# Patient Record
Sex: FEMALE | Race: BLACK OR AFRICAN AMERICAN | Employment: OTHER | URBAN - METROPOLITAN AREA
[De-identification: names, ages, dates, MRNs, and addresses within clinical notes are randomized per-mention and may not be internally consistent; named-entity substitution may affect disease eponyms.]

---

## 2023-04-13 DIAGNOSIS — M81.0 OSTEOPOROSIS: Primary | ICD-10-CM

## 2023-11-17 ENCOUNTER — HOSPITAL ENCOUNTER (EMERGENCY)
Facility: HOSPITAL | Age: 88
Discharge: HOME | End: 2023-11-17
Attending: EMERGENCY MEDICINE
Payer: MEDICARE

## 2023-11-17 VITALS
TEMPERATURE: 98 F | DIASTOLIC BLOOD PRESSURE: 83 MMHG | HEART RATE: 91 BPM | RESPIRATION RATE: 19 BRPM | SYSTOLIC BLOOD PRESSURE: 148 MMHG | OXYGEN SATURATION: 99 %

## 2023-11-17 DIAGNOSIS — R07.81 RIB PAIN: ICD-10-CM

## 2023-11-17 DIAGNOSIS — S29.8XXA BLUNT TRAUMA TO CHEST, INITIAL ENCOUNTER: Primary | ICD-10-CM

## 2023-11-17 DIAGNOSIS — D32.9 MENINGIOMA: ICD-10-CM

## 2023-11-17 DIAGNOSIS — S20.212A CONTUSION OF LEFT CHEST WALL, INITIAL ENCOUNTER: ICD-10-CM

## 2023-11-17 LAB
ALBUMIN SERPL-MCNC: 4.1 G/DL (ref 3.4–4.8)
ALBUMIN/GLOB SERPL: 0.9 RATIO (ref 1.1–2)
ALP SERPL-CCNC: 100 UNIT/L (ref 40–150)
ALT SERPL-CCNC: 10 UNIT/L (ref 0–55)
AST SERPL-CCNC: 15 UNIT/L (ref 5–34)
BASOPHILS # BLD AUTO: 0.03 X10(3)/MCL
BASOPHILS NFR BLD AUTO: 0.5 %
BILIRUB SERPL-MCNC: 0.5 MG/DL
BUN SERPL-MCNC: 13.7 MG/DL (ref 9.8–20.1)
CALCIUM SERPL-MCNC: 10.8 MG/DL (ref 8.4–10.2)
CHLORIDE SERPL-SCNC: 101 MMOL/L (ref 98–111)
CO2 SERPL-SCNC: 24 MMOL/L (ref 23–31)
CREAT SERPL-MCNC: 0.81 MG/DL (ref 0.55–1.02)
EOSINOPHIL # BLD AUTO: 0.08 X10(3)/MCL (ref 0–0.9)
EOSINOPHIL NFR BLD AUTO: 1.4 %
ERYTHROCYTE [DISTWIDTH] IN BLOOD BY AUTOMATED COUNT: 16.2 % (ref 11.5–17)
GFR SERPLBLD CREATININE-BSD FMLA CKD-EPI: >60 MLS/MIN/1.73/M2
GLOBULIN SER-MCNC: 4.6 GM/DL (ref 2.4–3.5)
GLUCOSE SERPL-MCNC: 127 MG/DL (ref 75–121)
HCT VFR BLD AUTO: 39.5 % (ref 37–47)
HGB BLD-MCNC: 12.7 G/DL (ref 12–16)
IMM GRANULOCYTES # BLD AUTO: 0.03 X10(3)/MCL (ref 0–0.04)
IMM GRANULOCYTES NFR BLD AUTO: 0.5 %
LYMPHOCYTES # BLD AUTO: 1 X10(3)/MCL (ref 0.6–4.6)
LYMPHOCYTES NFR BLD AUTO: 17 %
MCH RBC QN AUTO: 26.1 PG (ref 27–31)
MCHC RBC AUTO-ENTMCNC: 32.2 G/DL (ref 33–36)
MCV RBC AUTO: 81.1 FL (ref 80–94)
MONOCYTES # BLD AUTO: 0.44 X10(3)/MCL (ref 0.1–1.3)
MONOCYTES NFR BLD AUTO: 7.5 %
NEUTROPHILS # BLD AUTO: 4.3 X10(3)/MCL (ref 2.1–9.2)
NEUTROPHILS NFR BLD AUTO: 73.1 %
NRBC BLD AUTO-RTO: 0 %
PLATELET # BLD AUTO: 393 X10(3)/MCL (ref 130–400)
PMV BLD AUTO: 9.1 FL (ref 7.4–10.4)
POTASSIUM SERPL-SCNC: 3.7 MMOL/L (ref 3.5–5.1)
PROT SERPL-MCNC: 8.7 GM/DL (ref 5.8–7.6)
RBC # BLD AUTO: 4.87 X10(6)/MCL (ref 4.2–5.4)
SODIUM SERPL-SCNC: 140 MMOL/L (ref 132–146)
WBC # SPEC AUTO: 5.88 X10(3)/MCL (ref 4.5–11.5)

## 2023-11-17 PROCEDURE — 85025 COMPLETE CBC W/AUTO DIFF WBC: CPT | Performed by: STUDENT IN AN ORGANIZED HEALTH CARE EDUCATION/TRAINING PROGRAM

## 2023-11-17 PROCEDURE — 99285 EMERGENCY DEPT VISIT HI MDM: CPT | Mod: 25

## 2023-11-17 PROCEDURE — 25000003 PHARM REV CODE 250

## 2023-11-17 PROCEDURE — 25500020 PHARM REV CODE 255: Performed by: EMERGENCY MEDICINE

## 2023-11-17 PROCEDURE — 80053 COMPREHEN METABOLIC PANEL: CPT | Performed by: STUDENT IN AN ORGANIZED HEALTH CARE EDUCATION/TRAINING PROGRAM

## 2023-11-17 RX ORDER — DICLOFENAC SODIUM 10 MG/G
2 GEL TOPICAL DAILY
Qty: 100 G | Refills: 0 | Status: SHIPPED | OUTPATIENT
Start: 2023-11-17 | End: 2023-12-17

## 2023-11-17 RX ORDER — ACETAMINOPHEN 325 MG/1
650 TABLET ORAL
Status: COMPLETED | OUTPATIENT
Start: 2023-11-17 | End: 2023-11-17

## 2023-11-17 RX ADMIN — IOPAMIDOL 100 ML: 755 INJECTION, SOLUTION INTRAVENOUS at 03:11

## 2023-11-17 RX ADMIN — ACETAMINOPHEN 650 MG: 325 TABLET, FILM COATED ORAL at 01:11

## 2023-11-17 NOTE — ED PROVIDER NOTES
Encounter Date: 11/17/2023       History     Chief Complaint   Patient presents with    Fall     AASi post fall from step ladder on first step, hit chest. Denies blood thinners or hitting head.     89-year-old female presents to the emergency department complaining of chest pain after fall from a step ladder.  Striking her head or LOC. reports mild dizziness    The history is provided by the patient.     Review of patient's allergies indicates:  Not on File  No past medical history on file.  No past surgical history on file.  No family history on file.     Review of Systems   Constitutional:  Negative for fever.   Respiratory:  Negative for shortness of breath.    Cardiovascular:  Positive for chest pain.   Gastrointestinal:  Negative for abdominal pain, nausea and vomiting.   Neurological:  Positive for dizziness. Negative for syncope and headaches.       Physical Exam     Initial Vitals [11/17/23 1214]   BP Pulse Resp Temp SpO2   (!) 149/95 94 18 97.5 °F (36.4 °C) 99 %      MAP       --         Physical Exam    Nursing note and vitals reviewed.  Constitutional: She appears well-developed and well-nourished. No distress.   HENT:   Head: Normocephalic and atraumatic.   Mouth/Throat: Oropharynx is clear and moist.   Eyes: Conjunctivae are normal. Pupils are equal, round, and reactive to light.   Neck: Neck supple.   Normal range of motion.  Cardiovascular:  Normal rate, regular rhythm and normal heart sounds.           No murmur heard.  Pulmonary/Chest: Breath sounds normal. No respiratory distress. She exhibits tenderness (Chest wall tenderness, no gross deformity).   Abdominal: Abdomen is soft. She exhibits no distension. There is no abdominal tenderness.   Musculoskeletal:         General: Normal range of motion.      Cervical back: Normal range of motion and neck supple.     Neurological: She is alert and oriented to person, place, and time. GCS score is 15. GCS eye subscore is 4. GCS verbal subscore is 5. GCS  motor subscore is 6.   Skin: Skin is warm and dry. No rash noted.   Psychiatric: She has a normal mood and affect.         ED Course   Procedures  Labs Reviewed   COMPREHENSIVE METABOLIC PANEL - Abnormal; Notable for the following components:       Result Value    Glucose Level 127 (*)     Calcium Level Total 10.8 (*)     Protein Total 8.7 (*)     Globulin 4.6 (*)     Albumin/Globulin Ratio 0.9 (*)     All other components within normal limits   CBC WITH DIFFERENTIAL - Abnormal; Notable for the following components:    MCH 26.1 (*)     MCHC 32.2 (*)     All other components within normal limits   CBC W/ AUTO DIFFERENTIAL    Narrative:     The following orders were created for panel order CBC auto differential.  Procedure                               Abnormality         Status                     ---------                               -----------         ------                     CBC with Differential[[id number]]        Abnormal            Final result                 Please view results for these tests on the individual orders.          Imaging Results              CT Head Without Contrast (Final result)  Result time 11/17/23 15:56:12      Final result by Ria Hinojosa MD (11/17/23 15:56:12)                   Impression:      Chronic age-related changes.    Calcified mass in the temporal fossa on the right side likely representing a calcified meningioma.  Contrast enhanced CT scan or MRI can be performed for correlation.      Electronically signed by: Ria Hinojosa  Date:    11/17/2023  Time:    15:56               Narrative:    EXAMINATION:  CT HEAD WITHOUT CONTRAST    CLINICAL HISTORY:  Head trauma, minor (Age >= 65y);    TECHNIQUE:  Multiple axial images were obtained from the base of the brain to the vertex without contrast administration.  Sagittal and coronal reconstructions were performed..Automatic exposure control is utilized to reduce patient radiation  exposure.    COMPARISON:  None    FINDINGS:  3.8 x 1.8 cm mass in the temporal fossa on the right side.  It is densely calcified.  Findings most likely represent a calcified meningioma..  No hemorrhage is seen.  No acute infarct is seen.  There is some cerebral atrophy seen.  There is some compensatory ventricular dilatation and periventricular white matter changes consistent with the patient's age.  Calvarium is intact.  The posterior fossa is unremarkable..  The visualized portions of the paranasal sinuses show no acute abnormality.                                       CT Cervical Spine Without Contrast (Final result)  Result time 11/17/23 15:50:35      Final result by Ria Hinojosa MD (11/17/23 15:50:35)                   Impression:      Degenerative changes in the cervical spine    Large calcified lesion in the temporal fossa on the right side likely representing a calcified meningioma      Electronically signed by: Ria Hinojosa  Date:    11/17/2023  Time:    15:50               Narrative:    EXAMINATION:  CT CERVICAL SPINE WITHOUT CONTRAST    CLINICAL HISTORY:  Neck trauma (Age >= 65y);    TECHNIQUE:  Low dose axial images, sagittal and coronal reformations were performed though the cervical spine.  Contrast was not administered. Automatic exposure control is utilized to reduce patient radiation exposure.    COMPARISON:  None    FINDINGS:  The vertebral body heights are well maintained.  The alignment is well maintained.  The bones are  osteoporotic.  There are degenerative changes seen throughout the cervical spine.  No evidence of acute fracture is seen.  No dislocation is seen.  Odontoid lateral masses appear grossly unremarkable.  No soft tissue abnormality is seen.  No retropharyngeal abnormality is seen.  Visualized portions of the airway appear grossly unremarkable.  Visualized portion of the thoracic inlet appears unremarkable    There is a large calcified mass in the temporal region  on the right side likely representing a large calcified meningioma                                       CT Chest Abdomen Pelvis With IV Contrast (XPD) (Final result)  Result time 11/17/23 15:48:06      Final result by Ria Hinojosa MD (11/17/23 15:48:06)                   Impression:      No evidence of acute trauma    Cholelithiasis      Electronically signed by: Ria Hinojosa  Date:    11/17/2023  Time:    15:48               Narrative:    EXAMINATION:  CT CHEST ABDOMEN PELVIS WITH IV CONTRAST (XPD)    CLINICAL HISTORY:  Polytrauma, blunt;    TECHNIQUE:  Low dose axial images, sagittal and coronal reformations were obtained from the thoracic inlet to the pubic symphysis following the IV contrast administration. Automatic exposure control is utilized to reduce patient radiation exposure.    COMPARISON:  None    FINDINGS:  There are changes seen consistent with COPD and emphysema in the lungs bilaterally.  There is mild bibasilar atelectasis.  No pneumothorax is seen.  No pulmonary contusion is seen.  No pleural effusion is seen.  No infiltrate is seen.    The thoracic aorta is normal in caliber.  No dissection or aneurysm is seen.  No retrosternal hematoma is seen.    The abdominal aorta appears grossly unremarkable.  No dissection or posttraumatic changes are seen.    The heart appears normal.    The liver appears normal.  No liver mass or lesion is seen.  No evidence of liver laceration is seen.    There is a gallstone in the gallbladder...    The spleen appears normal.  No splenic laceration is seen.  The pancreas appears grossly unremarkable.  No pancreatic mass or lesion is seen.  No inflammation is seen.    No adrenal abnormality is seen.  No adrenal nodule is seen.    The kidneys are well perfused.  No hydronephrosis is seen.  No hydroureter is seen.  No retroperitoneal hematoma is seen.    Visualized portions of the bowel shows no acute abnormality.  No colitis is seen.  No diverticulitis is  seen.  No colonic mass is seen.    No free air is seen.  No free fluid is seen.    Urinary bladder appears unremarkable.    No sternal fracture is seen.  No thoracic spine fracture is seen.  No lumbar spine fracture is seen.  No pelvic fracture is seen.  There is an old fracture of the ischium on the right side.  No rib fractures are seen.                                       X-Ray Ribs 2 View Right (Final result)  Result time 11/17/23 13:16:45      Final result by Claudio Myers MD (11/17/23 13:16:45)                   Narrative:    EXAMINATION  *XR RIBS 2 VIEW RIGHT  *XR RIBS 2 VIEW LEFT    CLINICAL HISTORY  rib pain; Pleurodynia    TECHNIQUE  Total of 4 images of the right and left ribs.    COMPARISON  None available at the time of initial interpretation.    FINDINGS  There are questionable nondisplaced, subtle fractures involving the posterior right 6th and 7th ribs, with no other convincing acute osseous disruption identified.  Regional degenerative changes are present.    No acute cardiopulmonary process is appreciated.  There is diffuse calcification of the ectatic aorta.    IMPRESSION  1. Possible nondisplaced posterior right 6th and 7th rib fractures.  Correlation with evidence of point tenderness at these locations recommended.  2. No other convincing acute abnormality.  Chronic secondary findings discussed above.      Electronically signed by: Claudio Myers  Date:    11/17/2023  Time:    13:16                                     X-Ray Ribs 2 View Left (Final result)  Result time 11/17/23 13:16:45   Procedure changed from X-Ray Ribs 3 Views Bilateral     Final result by Claudio Myers MD (11/17/23 13:16:45)                   Narrative:    EXAMINATION  *XR RIBS 2 VIEW RIGHT  *XR RIBS 2 VIEW LEFT    CLINICAL HISTORY  rib pain; Pleurodynia    TECHNIQUE  Total of 4 images of the right and left ribs.    COMPARISON  None available at the time of initial interpretation.    FINDINGS  There are questionable  nondisplaced, subtle fractures involving the posterior right 6th and 7th ribs, with no other convincing acute osseous disruption identified.  Regional degenerative changes are present.    No acute cardiopulmonary process is appreciated.  There is diffuse calcification of the ectatic aorta.    IMPRESSION  1. Possible nondisplaced posterior right 6th and 7th rib fractures.  Correlation with evidence of point tenderness at these locations recommended.  2. No other convincing acute abnormality.  Chronic secondary findings discussed above.      Electronically signed by: Claudio Myers  Date:    11/17/2023  Time:    13:16                                     Medications   acetaminophen tablet 650 mg (650 mg Oral Given 11/17/23 1323)   iopamidoL (ISOVUE-370) injection 100 mL (100 mLs Intravenous Given 11/17/23 1534)     Medical Decision Making  Problems Addressed:  Blunt trauma to chest, initial encounter: acute illness or injury  Contusion of left chest wall, initial encounter: acute illness or injury  Meningioma: undiagnosed new problem with uncertain prognosis  Rib pain: acute illness or injury    Amount and/or Complexity of Data Reviewed  Radiology: ordered.    Risk  Prescription drug management.      ED assessment:    Ms. Easley presented for evaluation following fall from a ladder, complained of chest wall pain, dizziness.  Denies striking her head or LOC. No anticoagulant use.    Differential diagnosis (including but not limited to):   Minor head injury, intracranial hemorrhage, concussion, whiplash injury, spinal strain, rib fracture, pulmonary contusion, pneumothorax    ED management:   Rib x-ray describes possible sequential rib fractures on the right.  Given her advanced age and comorbid risk multiple rib fractures, additional imaging obtained to evaluate for extent of injury.  Additional imaging demonstrated no clinically significant intracranial spinal, thoracic or abdominal injury.  Additionally, laboratory  studies were unremarkable.  Noted posterior fossa calcified mass on the CT of the head.  Unlikely related to acute presentation; however, patient and family at bedside informed of this finding, advised to follow up with primary care to further investigate. ED return precautions reviewed at the bedside and provided in the written discharge instructions. All questions answered to the best of my ability.       My independent radiology interpretation:   Chest x-ray:  No displaced rib fracture, no pneumothorax.        Amount and/or Complexity of Data Reviewed  Independent historian: none   Summary of history:   External data reviewed: no prior records available for review   Summary of data reviewed:  No recent pertinent records available for review  Risk and benefits of testing: discussed   Labs: ordered and reviewed  Radiology: ordered and independent interpretation performed (see above or ED course)      Risk  Prescription drug management   Shared decision making     Critical Care  none    Frida BARAJAS MD personally performed the history, PE, MDM, and procedures as documented above and agree with the scribe's documentation.              ED Course as of 12/11/23 1602   Fri Nov 17, 2023   1552 No rib fracture seen on CT imaging.  No additional injuries identified.  Patient resting comfortably.  Will discharge home. [KS]      ED Course User Index  [KS] Frida Hamilton MD           Clinical Impression:  Final diagnoses:  [R07.81] Rib pain  [S29.8XXA] Blunt trauma to chest, initial encounter (Primary)  [D32.9] Meningioma  [S20.212A] Contusion of left chest wall, initial encounter          ED Disposition Condition    Discharge Stable          ED Prescriptions       Medication Sig Dispense Start Date End Date Auth. Provider    diclofenac sodium (VOLTAREN) 1 % Gel Apply 2 g topically once daily. 100 g 11/17/2023 12/17/2023 Frida Hamilton MD          Follow-up Information       Follow up With Specialties Details Why  Contact Info    Your primary care physician  Schedule an appointment as soon as possible for a visit   Call your primary care physician or you can call [phone] to schedule with a primary care physician    [facility] - Emergency Dept Emergency Medicine   [address] [place] [id number] [phone]             Frida Hamilton MD  12/11/23 [id number]

## 2023-11-17 NOTE — FIRST PROVIDER EVALUATION
Medical screening examination initiated.  I have conducted a focused provider triage encounter, findings are as follows:    Brief history of present illness:  arrived to ED due to fall from step stool. Reports she fell on landed on step stool. C/o rib pain. Denies LOC, head injury, or neck pain. Denies BT use.     Vitals:    11/17/23 1214   BP: (!) 149/95   Pulse: 94   Resp: 18   Temp: 97.5 °F (36.4 °C)   TempSrc: Temporal   SpO2: 99%       Pertinent physical exam:  awake, alert, has non-labored breathing.     Brief workup plan:  imaging and medication     Preliminary workup initiated; this workup will be continued and followed by the physician or advanced practice provider that is assigned to the patient when roomed.

## 2023-11-17 NOTE — DISCHARGE INSTRUCTIONS
Your CT scan shows a growth on the left temporal area of your brain consistent with a calcified meningioma.  This can be further investigated in the outpatient setting with additional CT or MRI imaging.  Please inform your primary care physician of these findings.

## 2024-04-01 ENCOUNTER — HOSPITAL ENCOUNTER (EMERGENCY)
Facility: HOSPITAL | Age: 89
Discharge: HOME | End: 2024-04-01
Attending: STUDENT IN AN ORGANIZED HEALTH CARE EDUCATION/TRAINING PROGRAM
Payer: MEDICARE

## 2024-04-01 VITALS
HEIGHT: 64 IN | SYSTOLIC BLOOD PRESSURE: 120 MMHG | OXYGEN SATURATION: 94 % | HEART RATE: 66 BPM | WEIGHT: 115 LBS | RESPIRATION RATE: 20 BRPM | BODY MASS INDEX: 19.63 KG/M2 | DIASTOLIC BLOOD PRESSURE: 54 MMHG | TEMPERATURE: 98 F

## 2024-04-01 DIAGNOSIS — M10.9 GOUT OF LEFT ANKLE, UNSPECIFIED CAUSE, UNSPECIFIED CHRONICITY: Primary | ICD-10-CM

## 2024-04-01 DIAGNOSIS — R60.9 SWELLING: ICD-10-CM

## 2024-04-01 DIAGNOSIS — E87.6 HYPOKALEMIA: ICD-10-CM

## 2024-04-01 DIAGNOSIS — M25.473 ANKLE SWELLING: ICD-10-CM

## 2024-04-01 LAB
ANION GAP SERPL CALC-SCNC: 11 MEQ/L
ANION GAP SERPL CALC-SCNC: 14 MEQ/L
BASOPHILS # BLD AUTO: 0.01 X10(3)/MCL
BASOPHILS NFR BLD AUTO: 0.2 %
BNP BLD-MCNC: 63.8 PG/ML
BUN SERPL-MCNC: 12.1 MG/DL (ref 9.8–20.1)
BUN SERPL-MCNC: 14.1 MG/DL (ref 9.8–20.1)
CALCIUM SERPL-MCNC: 9.7 MG/DL (ref 8.4–10.2)
CALCIUM SERPL-MCNC: 9.8 MG/DL (ref 8.4–10.2)
CHLORIDE SERPL-SCNC: 102 MMOL/L (ref 98–111)
CHLORIDE SERPL-SCNC: 106 MMOL/L (ref 98–111)
CO2 SERPL-SCNC: 26 MMOL/L (ref 23–31)
CO2 SERPL-SCNC: 27 MMOL/L (ref 23–31)
CREAT SERPL-MCNC: 0.72 MG/DL (ref 0.55–1.02)
CREAT SERPL-MCNC: 0.78 MG/DL (ref 0.55–1.02)
CREAT/UREA NIT SERPL: 16
CREAT/UREA NIT SERPL: 20
EOSINOPHIL # BLD AUTO: 0.15 X10(3)/MCL (ref 0–0.9)
EOSINOPHIL NFR BLD AUTO: 2.6 %
ERYTHROCYTE [DISTWIDTH] IN BLOOD BY AUTOMATED COUNT: 16 % (ref 11.5–17)
ERYTHROCYTE [SEDIMENTATION RATE] IN BLOOD: 64 MM/HR (ref 0–20)
GFR SERPLBLD CREATININE-BSD FMLA CKD-EPI: >60 MLS/MIN/1.73/M2
GFR SERPLBLD CREATININE-BSD FMLA CKD-EPI: >60 MLS/MIN/1.73/M2
GLUCOSE SERPL-MCNC: 141 MG/DL (ref 75–121)
GLUCOSE SERPL-MCNC: 95 MG/DL (ref 75–121)
HCT VFR BLD AUTO: 32.5 % (ref 37–47)
HGB BLD-MCNC: 10.5 G/DL (ref 12–16)
IMM GRANULOCYTES # BLD AUTO: 0.01 X10(3)/MCL (ref 0–0.04)
IMM GRANULOCYTES NFR BLD AUTO: 0.2 %
LYMPHOCYTES # BLD AUTO: 1.04 X10(3)/MCL (ref 0.6–4.6)
LYMPHOCYTES NFR BLD AUTO: 18 %
MCH RBC QN AUTO: 26.3 PG (ref 27–31)
MCHC RBC AUTO-ENTMCNC: 32.3 G/DL (ref 33–36)
MCV RBC AUTO: 81.5 FL (ref 80–94)
MONOCYTES # BLD AUTO: 0.65 X10(3)/MCL (ref 0.1–1.3)
MONOCYTES NFR BLD AUTO: 11.3 %
NEUTROPHILS # BLD AUTO: 3.91 X10(3)/MCL (ref 2.1–9.2)
NEUTROPHILS NFR BLD AUTO: 67.7 %
PLATELET # BLD AUTO: 307 X10(3)/MCL (ref 130–400)
PMV BLD AUTO: 9.2 FL (ref 7.4–10.4)
POC CARDIAC TROPONIN I: 0.02 NG/ML (ref 0–0.08)
POTASSIUM SERPL-SCNC: 2.7 MMOL/L (ref 3.5–5.1)
POTASSIUM SERPL-SCNC: 3.1 MMOL/L (ref 3.5–5.1)
RBC # BLD AUTO: 3.99 X10(6)/MCL (ref 4.2–5.4)
SAMPLE: NORMAL
SODIUM SERPL-SCNC: 143 MMOL/L (ref 132–146)
SODIUM SERPL-SCNC: 143 MMOL/L (ref 132–146)
URATE SERPL-MCNC: 6.4 MG/DL (ref 2.6–6)
WBC # SPEC AUTO: 5.77 X10(3)/MCL (ref 4.5–11.5)

## 2024-04-01 PROCEDURE — 84550 ASSAY OF BLOOD/URIC ACID: CPT | Performed by: STUDENT IN AN ORGANIZED HEALTH CARE EDUCATION/TRAINING PROGRAM

## 2024-04-01 PROCEDURE — 25000003 PHARM REV CODE 250: Performed by: STUDENT IN AN ORGANIZED HEALTH CARE EDUCATION/TRAINING PROGRAM

## 2024-04-01 PROCEDURE — 96375 TX/PRO/DX INJ NEW DRUG ADDON: CPT

## 2024-04-01 PROCEDURE — 84484 ASSAY OF TROPONIN QUANT: CPT

## 2024-04-01 PROCEDURE — 80048 BASIC METABOLIC PNL TOTAL CA: CPT | Mod: 91 | Performed by: STUDENT IN AN ORGANIZED HEALTH CARE EDUCATION/TRAINING PROGRAM

## 2024-04-01 PROCEDURE — 85652 RBC SED RATE AUTOMATED: CPT | Performed by: STUDENT IN AN ORGANIZED HEALTH CARE EDUCATION/TRAINING PROGRAM

## 2024-04-01 PROCEDURE — 83880 ASSAY OF NATRIURETIC PEPTIDE: CPT | Performed by: STUDENT IN AN ORGANIZED HEALTH CARE EDUCATION/TRAINING PROGRAM

## 2024-04-01 PROCEDURE — 85025 COMPLETE CBC W/AUTO DIFF WBC: CPT | Performed by: STUDENT IN AN ORGANIZED HEALTH CARE EDUCATION/TRAINING PROGRAM

## 2024-04-01 PROCEDURE — 93010 ELECTROCARDIOGRAM REPORT: CPT | Mod: ,,, | Performed by: INTERNAL MEDICINE

## 2024-04-01 PROCEDURE — 63600175 PHARM REV CODE 636 W HCPCS: Performed by: STUDENT IN AN ORGANIZED HEALTH CARE EDUCATION/TRAINING PROGRAM

## 2024-04-01 PROCEDURE — 99285 EMERGENCY DEPT VISIT HI MDM: CPT | Mod: 25

## 2024-04-01 PROCEDURE — 93005 ELECTROCARDIOGRAM TRACING: CPT

## 2024-04-01 PROCEDURE — 96365 THER/PROPH/DIAG IV INF INIT: CPT

## 2024-04-01 RX ORDER — POTASSIUM CHLORIDE 7.45 MG/ML
10 INJECTION INTRAVENOUS
Status: DISPENSED | OUTPATIENT
Start: 2024-04-01 | End: 2024-04-01

## 2024-04-01 RX ORDER — KETOROLAC TROMETHAMINE 30 MG/ML
15 INJECTION, SOLUTION INTRAMUSCULAR; INTRAVENOUS
Status: COMPLETED | OUTPATIENT
Start: 2024-04-01 | End: 2024-04-01

## 2024-04-01 RX ORDER — POTASSIUM CHLORIDE 20 MEQ/1
20 TABLET, EXTENDED RELEASE ORAL DAILY
Qty: 4 TABLET | Refills: 0 | Status: SHIPPED | OUTPATIENT
Start: 2024-04-01 | End: 2024-04-05

## 2024-04-01 RX ORDER — IBUPROFEN 600 MG/1
600 TABLET ORAL 3 TIMES DAILY
Qty: 15 TABLET | Refills: 0 | Status: SHIPPED | OUTPATIENT
Start: 2024-04-01 | End: 2024-04-06

## 2024-04-01 RX ORDER — POTASSIUM CHLORIDE 20 MEQ/1
40 TABLET, EXTENDED RELEASE ORAL
Status: COMPLETED | OUTPATIENT
Start: 2024-04-01 | End: 2024-04-01

## 2024-04-01 RX ADMIN — KETOROLAC TROMETHAMINE 15 MG: 30 INJECTION, SOLUTION INTRAMUSCULAR at 04:04

## 2024-04-01 RX ADMIN — POTASSIUM CHLORIDE 40 MEQ: 1500 TABLET, EXTENDED RELEASE ORAL at 04:04

## 2024-04-01 RX ADMIN — POTASSIUM CHLORIDE 10 MEQ: 7.46 INJECTION, SOLUTION INTRAVENOUS at 04:04

## 2024-04-01 NOTE — ED PROVIDER NOTES
Encounter Date: 4/1/2024       History     Chief Complaint   Patient presents with    Foot Pain     Pt reports of bilateral foot pain and swelling also complains of chest discomfort this morning aroound 8 am- but denies CP/SOB at this time.      Patient is a 89-year-old  female with a history of diabetes, hypertension hyperlipidemia presented to the ER today with a several week history of bilateral ankle swelling and pain.  She states the left is slightly worse than the right.  She denies any associated calf pain, erythema.  She denies any chest pain, shortness of breath.  She states she is tried nothing for relief.  She states she has no orthopnea or heart failure that she knows of.  She denies any fevers, chills, cough, congestion, abdominal pain, dysuria, hematuria.      Review of patient's allergies indicates:  No Known Allergies  No past medical history on file.  No past surgical history on file.  No family history on file.     Review of Systems   Constitutional:  Negative for chills, fatigue and fever.   HENT:  Negative for congestion, sore throat and trouble swallowing.    Eyes:  Negative for pain and visual disturbance.   Respiratory:  Negative for cough, shortness of breath and wheezing.    Cardiovascular:  Negative for chest pain and palpitations.   Gastrointestinal:  Negative for abdominal pain, blood in stool, constipation, diarrhea, nausea and vomiting.   Genitourinary:  Negative for dysuria and hematuria.   Musculoskeletal:  Positive for arthralgias. Negative for back pain and myalgias.   Skin:  Negative for rash and wound.   Neurological:  Negative for seizures, syncope and headaches.   Psychiatric/Behavioral:  Negative for confusion. The patient is not nervous/anxious.        Physical Exam     Initial Vitals [04/01/24 1358]   BP Pulse Resp Temp SpO2   114/66 86 20 98.3 °F (36.8 °C) 97 %      MAP       --         Physical Exam    Nursing note and vitals reviewed.  Constitutional: She  appears well-developed and well-nourished. She is not diaphoretic. No distress.   HENT:   Head: Normocephalic.   Right Ear: External ear normal.   Left Ear: External ear normal.   Nose: Nose normal.   Eyes: Conjunctivae and EOM are normal. Right eye exhibits no discharge. Left eye exhibits no discharge. No scleral icterus.   Neck:   Normal range of motion.  Cardiovascular:  Normal rate, regular rhythm and normal heart sounds.     Exam reveals no gallop and no friction rub.       No murmur heard.  Pulmonary/Chest: Breath sounds normal. No stridor. No respiratory distress. She has no wheezes. She has no rhonchi. She has no rales.   Abdominal: Abdomen is soft. She exhibits no distension. There is no abdominal tenderness. There is no rebound and no guarding.   Musculoskeletal:         General: Normal range of motion.      Cervical back: Normal range of motion.      Comments: This slight tenderness to palpation over the medial malleolus bilaterally.  No obvious edema or calf tenderness on exam.  DP pulses equal bilaterally.  No signs of trauma on exam.  No erythema overlying the joints.     Neurological: She is alert.   Skin: Skin is warm. No rash noted. No erythema.   Psychiatric: She has a normal mood and affect. Her behavior is normal.         ED Course   Procedures  Labs Reviewed   BASIC METABOLIC PANEL - Abnormal; Notable for the following components:       Result Value    Potassium Level 2.7 (*)     Glucose Level 141 (*)     All other components within normal limits   SEDIMENTATION RATE, AUTOMATED - Abnormal; Notable for the following components:    Sed Rate 64 (*)     All other components within normal limits   URIC ACID - Abnormal; Notable for the following components:    Uric Acid 6.4 (*)     All other components within normal limits   CBC WITH DIFFERENTIAL - Abnormal; Notable for the following components:    RBC 3.99 (*)     Hgb 10.5 (*)     Hct 32.5 (*)     MCH 26.3 (*)     MCHC 32.3 (*)     All other  components within normal limits   BASIC METABOLIC PANEL - Abnormal; Notable for the following components:    Potassium Level 3.1 (*)     All other components within normal limits   B-TYPE NATRIURETIC PEPTIDE - Normal   CBC W/ AUTO DIFFERENTIAL    Narrative:     The following orders were created for panel order CBC Auto Differential.  Procedure                               Abnormality         Status                     ---------                               -----------         ------                     CBC with Differential[[id number]]       Abnormal            Final result                 Please view results for these tests on the individual orders.   TROPONIN ISTAT   POCT TROPONIN     EKG Readings: (Independently Interpreted)   Initial Reading: No STEMI. Rhythm: Normal Sinus Rhythm. Heart Rate: 87. Ectopy: No Ectopy. Conduction: Normal. ST Segments: Normal ST Segments. T Waves: Normal. Axis: Left Axis Deviation.       Imaging Results              X-Ray Chest 1 View (Final result)  Result time 04/01/24 14:50:21      Final result by Karan Metzger MD (04/01/24 14:50:21)                   Impression:      Cardiomegaly      Electronically signed by: Karan Metzger  Date:    04/01/2024  Time:    14:50               Narrative:    EXAMINATION:  XR CHEST 1 VIEW    CPT 44229    CLINICAL HISTORY:  cp;    FINDINGS:  Examination reveals mediastinal silhouette to be within normal limits cardiac silhouette is enlarged lung fields are clear and free of gross infiltrates atelectasis or effusions                                       X-Ray Ankle Complete Left (Final result)  Result time 04/01/24 14:48:46      Final result by Damien West MD (04/01/24 14:48:46)                   Impression:      No acute fractures are seen      Electronically signed by: Damien West MD  Date:    04/01/2024  Time:    14:48               Narrative:    EXAMINATION:  XR ANKLE COMPLETE 3 VIEW LEFT    CLINICAL HISTORY:  Edema,  unspecified    TECHNIQUE:  AP, lateral and oblique views of the left ankle were performed.    COMPARISON:  None    FINDINGS:  There are no fractures seen.  There is no dislocation.  The bone is osteopenic.                                       X-Ray Ankle Complete Right (Final result)  Result time 04/01/24 14:49:12      Final result by Damien West MD (04/01/24 14:49:12)                   Impression:      No acute abnormalities are demonstrated.      Electronically signed by: Damien West MD  Date:    04/01/2024  Time:    14:49               Narrative:    EXAMINATION:  XR ANKLE COMPLETE 3 VIEW RIGHT    CLINICAL HISTORY:  Edema, unspecified    TECHNIQUE:  AP, lateral, and oblique images of the right ankle were performed.    COMPARISON:  None    FINDINGS:  There are no fractures seen.  There is no dislocation.  The bone is osteopenic.                                       Medications   potassium chloride 10 mEq in 100 mL IVPB (0 mEq Intravenous Stopped 4/1/24 1722)   potassium chloride SA CR tablet 40 mEq (40 mEq Oral Given 4/1/24 1613)   ketorolac injection 15 mg (15 mg Intravenous Given 4/1/24 1612)     Medical Decision Making  Differentials: Gout, septic arthritis, ankle sprain, CHF exacerbation  History in his the patient and son   89-year-old well-appearing female with stable vital signs presents with chronic ankle pain.  It was slightly warm to palpation and has concerns for septic arthritis versus gouty arthritis.  X-ray confirmed no acute osseous abnormalities.  Basic labs were reassuring other than hypokalemia 2.7 which was replaced IV and p.o..  Repeat potassium of 3.1 and thus we can continue this potassium daily for the next 5 days.  This was sent to the pharmacy.  Uric acid was slightly elevated raising my suspicion for gouty arthritis as a was no leukocytosis.  Sed rate only mildly elevated.  Toradol given here and sent to the pharmacy.  Gout diet also discussed.  All questions answered in layman's  terms and return precautions were discussed.    Amount and/or Complexity of Data Reviewed  Labs: ordered. Decision-making details documented in ED Course.  Radiology: ordered. Decision-making details documented in ED Course.    Risk  Prescription drug management.                                      Clinical Impression:  Final diagnoses:  [M25.473] Ankle swelling  [R60.9] Swelling  [M10.9] Gout of left ankle, unspecified cause, unspecified chronicity (Primary)  [E87.6] Hypokalemia          ED Disposition Condition    Discharge Stable          ED Prescriptions       Medication Sig Dispense Start Date End Date Auth. Provider    potassium chloride SA (K-DUR,KLOR-CON) 20 MEQ tablet Take 1 tablet (20 mEq total) by mouth once daily. for 4 days 4 tablet 4/1/2024 4/5/2024 Yoni Pyle MD    ibuprofen (ADVIL,MOTRIN) 600 MG tablet Take 1 tablet (600 mg total) by mouth 3 (three) times daily. for 5 days 15 tablet 4/1/2024 4/6/2024 Yoni Pyle MD          Follow-up Information       Follow up With Specialties Details Why Contact Info    [facility] - Emergency Dept Emergency Medicine  If symptoms worsen [address] [id number] [phone]             Yoni Pyle MD  04/01/24 9277

## 2024-04-01 NOTE — ED NOTES
Pt assisted to bedside commode, tolerated well, denies any other concerns and verbalizes plan of care; no distress noted at this time

## 2024-04-02 LAB
OHS QRS DURATION: 88 MS
OHS QTC CALCULATION: 445 MS

## 2024-04-10 ENCOUNTER — HOSPITAL ENCOUNTER (EMERGENCY)
Facility: HOSPITAL | Age: 89
Discharge: HOME | End: 2024-04-10
Attending: EMERGENCY MEDICINE
Payer: MEDICARE

## 2024-04-10 VITALS
TEMPERATURE: 98 F | BODY MASS INDEX: 19.63 KG/M2 | RESPIRATION RATE: 18 BRPM | WEIGHT: 115 LBS | HEIGHT: 64 IN | SYSTOLIC BLOOD PRESSURE: 107 MMHG | HEART RATE: 77 BPM | DIASTOLIC BLOOD PRESSURE: 60 MMHG | OXYGEN SATURATION: 97 %
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
## 2024-04-10 DIAGNOSIS — S30.0XXA CONTUSION OF COCCYX, INITIAL ENCOUNTER: Primary | ICD-10-CM

## 2024-04-10 PROCEDURE — 99283 EMERGENCY DEPT VISIT LOW MDM: CPT | Mod: 25

## 2024-04-10 NOTE — ED PROVIDER NOTES
Encounter Date: 4/10/2024       History     Chief Complaint   Patient presents with    Tailbone Pain     Pt states on 4/3 she fell and landed on her tailbone, pt rates the pain as a 10/10, per caregiver states she might of gotten dizzy before she fell. Denies any LOC at that time.     This 89-year-old female fell on April 3rd and landed.  On her tailbone she presents with pain in her sacrum and tailbone rated 10/10.  She had no head injury no loss of consciousness.  She would refused to come in until today.       Review of patient's allergies indicates:  No Known Allergies  Past Medical History:   Diagnosis Date    Diabetes mellitus     GERD (gastroesophageal reflux disease)     Hypertension     Pancreatic insufficiency      History reviewed. No pertinent surgical history.  History reviewed. No pertinent family history.  Social History     Tobacco Use    Smoking status: Never    Smokeless tobacco: Never   Substance Use Topics    Alcohol use: Never    Drug use: Never     Review of Systems   Constitutional:  Negative for fever.   HENT:  Negative for sore throat.    Respiratory:  Negative for shortness of breath.    Cardiovascular:  Negative for chest pain.   Gastrointestinal:  Negative for nausea.   Genitourinary:  Negative for dysuria.   Musculoskeletal:  Negative for back pain.   Skin:  Negative for rash.   Neurological:  Negative for weakness.   Hematological:  Does not bruise/bleed easily.       Physical Exam     Initial Vitals [04/10/24 1404]   BP Pulse Resp Temp SpO2   107/60 77 18 98.1 °F (36.7 °C) 97 %      MAP       --         Physical Exam    Nursing note and vitals reviewed.  Constitutional: She appears well-developed and well-nourished.   HENT:   Head: Normocephalic and atraumatic.   Eyes: Conjunctivae and EOM are normal. Pupils are equal, round, and reactive to light.   Neck: Neck supple.   Normal range of motion.  Cardiovascular:  Normal rate, regular rhythm, normal heart sounds and intact distal pulses.            Pulmonary/Chest: Breath sounds normal.   Abdominal: Abdomen is soft. Bowel sounds are normal.   Musculoskeletal:         General: Tenderness (Over the sacrum and coccyx) present. Normal range of motion.      Cervical back: Normal range of motion and neck supple.     Neurological: She is alert and oriented to person, place, and time. She has normal strength.   Skin: Skin is warm and dry. Capillary refill takes less than 2 seconds.   Psychiatric: She has a normal mood and affect. Her behavior is normal. Judgment and thought content normal.         ED Course   Procedures  Labs Reviewed - No data to display       Imaging Results              X-Ray Pelvis Routine AP (Preliminary result)  Result time 04/10/24 14:36:19      Wet Read by Cuong Peacock MD (04/10/24 14:36:19, [facility] Emergency [facility], Emergency Medicine)    No fracture seen                                     X-Ray Sacrum And Coccyx (Preliminary result)  Result time 04/10/24 14:35:35      Wet Read by Cuong Peacock MD (04/10/24 14:35:35, [facility] Emergency Dept, Emergency Medicine)    No fracture seen                                     Medications - No data to display  Medical Decision Making  Amount and/or Complexity of Data Reviewed  Radiology: ordered and independent interpretation performed.                                      Clinical Impression:  Final diagnoses:  [W19.XXXA, Y92.009] Fall at home  [S30.0XXA] Contusion of coccyx, initial encounter (Primary)          ED Disposition Condition    Discharge Stable          ED Prescriptions    None       Follow-up Information    None          Cuong Peacock MD  04/10/24 0671

## 2025-03-30 ENCOUNTER — HOSPITAL ENCOUNTER (INPATIENT)
Facility: HOSPITAL | Age: OVER 89
LOS: 2 days | Discharge: HOME HEALTH | DRG: 872 | End: 2025-04-02
Attending: FAMILY MEDICINE | Admitting: FAMILY MEDICINE
Payer: MEDICARE

## 2025-03-30 DIAGNOSIS — Z91.89 AT RISK FOR LONG QT SYNDROME: ICD-10-CM

## 2025-03-30 DIAGNOSIS — N39.0 ACUTE UTI: Primary | ICD-10-CM

## 2025-03-30 DIAGNOSIS — R07.9 CHEST PAIN: ICD-10-CM

## 2025-03-30 DIAGNOSIS — R53.1 WEAKNESS: ICD-10-CM

## 2025-03-30 DIAGNOSIS — E86.0 DEHYDRATION: ICD-10-CM

## 2025-03-30 DIAGNOSIS — Z72.0 TOBACCO USE: ICD-10-CM

## 2025-03-30 LAB
ALBUMIN SERPL-MCNC: 3.3 G/DL (ref 3.4–4.8)
ALBUMIN/GLOB SERPL: 0.7 RATIO (ref 1.1–2)
ALP SERPL-CCNC: 88 UNIT/L (ref 40–150)
ALT SERPL-CCNC: 16 UNIT/L (ref 0–55)
ANION GAP SERPL CALC-SCNC: 13 MEQ/L
AST SERPL-CCNC: 28 UNIT/L (ref 11–45)
BACTERIA #/AREA URNS AUTO: ABNORMAL /HPF
BASOPHILS # BLD AUTO: 0.01 X10(3)/MCL
BASOPHILS NFR BLD AUTO: 0.1 %
BILIRUB SERPL-MCNC: 0.5 MG/DL
BILIRUB UR QL STRIP.AUTO: NEGATIVE
BUN SERPL-MCNC: 46.4 MG/DL (ref 9.8–20.1)
CALCIUM SERPL-MCNC: 9.3 MG/DL (ref 8.4–10.2)
CHLORIDE SERPL-SCNC: 101 MMOL/L (ref 98–111)
CLARITY UR: CLEAR
CO2 SERPL-SCNC: 25 MMOL/L (ref 23–31)
COLOR UR AUTO: ABNORMAL
CREAT SERPL-MCNC: 1.88 MG/DL (ref 0.55–1.02)
CREAT/UREA NIT SERPL: 25
EOSINOPHIL # BLD AUTO: 0.01 X10(3)/MCL (ref 0–0.9)
EOSINOPHIL NFR BLD AUTO: 0.1 %
ERYTHROCYTE [DISTWIDTH] IN BLOOD BY AUTOMATED COUNT: 17 % (ref 11.5–17)
FLUAV AG UPPER RESP QL IA.RAPID: NOT DETECTED
FLUBV AG UPPER RESP QL IA.RAPID: NOT DETECTED
GFR SERPLBLD CREATININE-BSD FMLA CKD-EPI: 25 ML/MIN/1.73/M2
GLOBULIN SER-MCNC: 4.6 GM/DL (ref 2.4–3.5)
GLUCOSE SERPL-MCNC: 190 MG/DL (ref 75–121)
GLUCOSE UR QL STRIP: NEGATIVE
HCT VFR BLD AUTO: 32.5 % (ref 37–47)
HEMOCCULT SP1 STL QL: NEGATIVE
HGB BLD-MCNC: 10.4 G/DL (ref 12–16)
HGB UR QL STRIP: ABNORMAL
IMM GRANULOCYTES # BLD AUTO: 0.25 X10(3)/MCL (ref 0–0.04)
IMM GRANULOCYTES NFR BLD AUTO: 1.4 %
KETONES UR QL STRIP: NEGATIVE
LACTATE SERPL-SCNC: 2.9 MMOL/L (ref 0.5–2.2)
LACTATE SERPL-SCNC: 3.1 MMOL/L (ref 0.5–2.2)
LEUKOCYTE ESTERASE UR QL STRIP: ABNORMAL
LIPASE SERPL-CCNC: 7 U/L
LYMPHOCYTES # BLD AUTO: 0.63 X10(3)/MCL (ref 0.6–4.6)
LYMPHOCYTES NFR BLD AUTO: 3.6 %
MAGNESIUM SERPL-MCNC: 1.3 MG/DL (ref 1.6–2.6)
MCH RBC QN AUTO: 25.2 PG (ref 27–31)
MCHC RBC AUTO-ENTMCNC: 32 G/DL (ref 33–36)
MCV RBC AUTO: 78.9 FL (ref 80–94)
MONOCYTES # BLD AUTO: 1.3 X10(3)/MCL (ref 0.1–1.3)
MONOCYTES NFR BLD AUTO: 7.5 %
NEUTROPHILS # BLD AUTO: 15.16 X10(3)/MCL (ref 2.1–9.2)
NEUTROPHILS NFR BLD AUTO: 87.3 %
NITRITE UR QL STRIP: NEGATIVE
PH UR STRIP: 6 [PH]
PLATELET # BLD AUTO: 318 X10(3)/MCL (ref 130–400)
PMV BLD AUTO: 9.8 FL (ref 7.4–10.4)
POTASSIUM SERPL-SCNC: 3.3 MMOL/L (ref 3.5–5.1)
PROT SERPL-MCNC: 7.9 GM/DL (ref 5.8–7.6)
PROT UR QL STRIP: 100
RBC # BLD AUTO: 4.12 X10(6)/MCL (ref 4.2–5.4)
RBC #/AREA URNS AUTO: ABNORMAL /HPF
SARS-COV-2 RNA RESP QL NAA+PROBE: NOT DETECTED
SODIUM SERPL-SCNC: 139 MMOL/L (ref 136–145)
SP GR UR STRIP.AUTO: 1.01 (ref 1–1.03)
SQUAMOUS #/AREA URNS AUTO: ABNORMAL /HPF
UROBILINOGEN UR STRIP-ACNC: 0.2
WBC # BLD AUTO: 17.36 X10(3)/MCL (ref 4.5–11.5)
WBC #/AREA URNS AUTO: ABNORMAL /HPF

## 2025-03-30 PROCEDURE — 83735 ASSAY OF MAGNESIUM: CPT | Performed by: FAMILY MEDICINE

## 2025-03-30 PROCEDURE — 83605 ASSAY OF LACTIC ACID: CPT | Performed by: FAMILY MEDICINE

## 2025-03-30 PROCEDURE — 80053 COMPREHEN METABOLIC PANEL: CPT | Performed by: FAMILY MEDICINE

## 2025-03-30 PROCEDURE — 85025 COMPLETE CBC W/AUTO DIFF WBC: CPT | Performed by: FAMILY MEDICINE

## 2025-03-30 PROCEDURE — 99285 EMERGENCY DEPT VISIT HI MDM: CPT | Mod: 25

## 2025-03-30 PROCEDURE — 83690 ASSAY OF LIPASE: CPT | Performed by: FAMILY MEDICINE

## 2025-03-30 PROCEDURE — 25000003 PHARM REV CODE 250: Performed by: FAMILY MEDICINE

## 2025-03-30 PROCEDURE — G0378 HOSPITAL OBSERVATION PER HR: HCPCS

## 2025-03-30 PROCEDURE — 25000003 PHARM REV CODE 250: Performed by: SPECIALIST

## 2025-03-30 PROCEDURE — 96375 TX/PRO/DX INJ NEW DRUG ADDON: CPT

## 2025-03-30 PROCEDURE — 96361 HYDRATE IV INFUSION ADD-ON: CPT

## 2025-03-30 PROCEDURE — 87086 URINE CULTURE/COLONY COUNT: CPT | Performed by: FAMILY MEDICINE

## 2025-03-30 PROCEDURE — P9612 CATHETERIZE FOR URINE SPEC: HCPCS

## 2025-03-30 PROCEDURE — 63600175 PHARM REV CODE 636 W HCPCS: Performed by: SPECIALIST

## 2025-03-30 PROCEDURE — 82272 OCCULT BLD FECES 1-3 TESTS: CPT | Performed by: FAMILY MEDICINE

## 2025-03-30 PROCEDURE — 96365 THER/PROPH/DIAG IV INF INIT: CPT

## 2025-03-30 PROCEDURE — 0240U COVID/FLU A&B PCR: CPT | Performed by: SPECIALIST

## 2025-03-30 PROCEDURE — 81001 URINALYSIS AUTO W/SCOPE: CPT | Performed by: FAMILY MEDICINE

## 2025-03-30 RX ORDER — CEFTRIAXONE 1 G/1
1 INJECTION, POWDER, FOR SOLUTION INTRAMUSCULAR; INTRAVENOUS
Status: DISCONTINUED | OUTPATIENT
Start: 2025-03-31 | End: 2025-04-02 | Stop reason: HOSPADM

## 2025-03-30 RX ORDER — ONDANSETRON HYDROCHLORIDE 2 MG/ML
4 INJECTION, SOLUTION INTRAVENOUS EVERY 8 HOURS PRN
Status: DISCONTINUED | OUTPATIENT
Start: 2025-03-31 | End: 2025-03-31

## 2025-03-30 RX ORDER — POLYETHYLENE GLYCOL 3350 17 G/17G
17 POWDER, FOR SOLUTION ORAL DAILY
Status: DISCONTINUED | OUTPATIENT
Start: 2025-03-31 | End: 2025-04-02 | Stop reason: HOSPADM

## 2025-03-30 RX ORDER — SODIUM CHLORIDE 9 MG/ML
1000 INJECTION, SOLUTION INTRAVENOUS CONTINUOUS
Status: ACTIVE | OUTPATIENT
Start: 2025-03-30 | End: 2025-03-31

## 2025-03-30 RX ORDER — TALC
6 POWDER (GRAM) TOPICAL NIGHTLY PRN
Status: DISCONTINUED | OUTPATIENT
Start: 2025-03-31 | End: 2025-03-31

## 2025-03-30 RX ORDER — SODIUM CHLORIDE 0.9 % (FLUSH) 0.9 %
2 SYRINGE (ML) INJECTION EVERY 6 HOURS PRN
Status: DISCONTINUED | OUTPATIENT
Start: 2025-03-31 | End: 2025-04-02 | Stop reason: HOSPADM

## 2025-03-30 RX ORDER — LOPERAMIDE HYDROCHLORIDE 2 MG/1
2 CAPSULE ORAL
Status: DISCONTINUED | OUTPATIENT
Start: 2025-03-31 | End: 2025-04-02 | Stop reason: HOSPADM

## 2025-03-30 RX ORDER — ONDANSETRON HYDROCHLORIDE 2 MG/ML
4 INJECTION, SOLUTION INTRAVENOUS EVERY 8 HOURS PRN
Status: DISCONTINUED | OUTPATIENT
Start: 2025-03-31 | End: 2025-04-02 | Stop reason: HOSPADM

## 2025-03-30 RX ORDER — CEFTRIAXONE 1 G/1
1 INJECTION, POWDER, FOR SOLUTION INTRAMUSCULAR; INTRAVENOUS
Status: COMPLETED | OUTPATIENT
Start: 2025-03-30 | End: 2025-03-30

## 2025-03-30 RX ORDER — ACETAMINOPHEN 325 MG/1
650 TABLET ORAL EVERY 8 HOURS PRN
Status: DISCONTINUED | OUTPATIENT
Start: 2025-03-31 | End: 2025-03-31

## 2025-03-30 RX ORDER — POTASSIUM CHLORIDE 20 MEQ/1
20 TABLET, EXTENDED RELEASE ORAL
Status: COMPLETED | OUTPATIENT
Start: 2025-03-30 | End: 2025-03-30

## 2025-03-30 RX ORDER — FAMOTIDINE 20 MG/1
20 TABLET, FILM COATED ORAL 2 TIMES DAILY
Status: DISCONTINUED | OUTPATIENT
Start: 2025-03-31 | End: 2025-04-02 | Stop reason: HOSPADM

## 2025-03-30 RX ORDER — ACETAMINOPHEN 325 MG/1
650 TABLET ORAL
Status: COMPLETED | OUTPATIENT
Start: 2025-03-30 | End: 2025-03-30

## 2025-03-30 RX ORDER — MAGNESIUM SULFATE 1 G/100ML
1 INJECTION INTRAVENOUS ONCE
Status: COMPLETED | OUTPATIENT
Start: 2025-03-30 | End: 2025-03-30

## 2025-03-30 RX ADMIN — SODIUM CHLORIDE 500 ML: 0.9 INJECTION, SOLUTION INTRAVENOUS at 06:03

## 2025-03-30 RX ADMIN — SODIUM CHLORIDE 500 ML: 9 INJECTION, SOLUTION INTRAVENOUS at 10:03

## 2025-03-30 RX ADMIN — SODIUM CHLORIDE 500 ML: 9 INJECTION, SOLUTION INTRAVENOUS at 08:03

## 2025-03-30 RX ADMIN — POTASSIUM CHLORIDE 20 MEQ: 1500 TABLET, EXTENDED RELEASE ORAL at 06:03

## 2025-03-30 RX ADMIN — ACETAMINOPHEN 650 MG: 325 TABLET ORAL at 07:03

## 2025-03-30 RX ADMIN — CEFTRIAXONE SODIUM 1 G: 1 INJECTION, POWDER, FOR SOLUTION INTRAMUSCULAR; INTRAVENOUS at 09:03

## 2025-03-30 RX ADMIN — MAGNESIUM SULFATE IN DEXTROSE 1 G: 10 INJECTION, SOLUTION INTRAVENOUS at 07:03

## 2025-03-30 NOTE — ED PROVIDER NOTES
Encounter Date: 3/30/2025       History     Chief Complaint   Patient presents with    Abdominal Pain     Pt reports of severe abd pain with bloody stools and weakness that has been ongoing for a few days.      Pt is a 89-year-old female presenting with blood in her urine and generalized weakness. She has experienced weakness for about a month. Pt also endorsing dysuria. Pt denies f/c/n/v/d/c, melena, hematochezia, CP, SOB, dizziness.        Review of patient's allergies indicates:  No Known Allergies  Past Medical History:   Diagnosis Date    Diabetes mellitus     GERD (gastroesophageal reflux disease)     Hypertension     Pancreatic insufficiency      No past surgical history on file.  No family history on file.  Social History[1]  Review of Systems   All other systems reviewed and are negative.      Physical Exam     Initial Vitals [03/30/25 1654]   BP Pulse Resp Temp SpO2   (!) 80/43 86 20 98.2 °F (36.8 °C) 95 %      MAP       --         Physical Exam    Nursing note and vitals reviewed.  Constitutional: She appears well-developed and well-nourished.   HENT:   Head: Normocephalic and atraumatic.   Eyes: EOM are normal.   Neck:   Normal range of motion.  Cardiovascular:  Normal rate, regular rhythm and normal heart sounds.           Pulmonary/Chest: Breath sounds normal.   Abdominal: Abdomen is soft. Bowel sounds are normal. She exhibits no distension. There is abdominal tenderness (epigastric TTP). There is no rebound and no guarding.   Musculoskeletal:         General: Normal range of motion.      Cervical back: Normal range of motion.     Neurological: She is alert.   Skin: Skin is warm and dry.   Psychiatric: She has a normal mood and affect. Thought content normal.         ED Course   Procedures  Labs Reviewed   COMPREHENSIVE METABOLIC PANEL - Abnormal       Result Value    Sodium 139      Potassium 3.3 (*)     Chloride 101      CO2 25      Glucose 190 (*)     Blood Urea Nitrogen 46.4 (*)     Creatinine 1.88  (*)     Calcium 9.3      Protein Total 7.9 (*)     Albumin 3.3 (*)     Globulin 4.6 (*)     Albumin/Globulin Ratio 0.7 (*)     Bilirubin Total 0.5      ALP 88      ALT 16      AST 28      eGFR 25      Anion Gap 13.0      BUN/Creatinine Ratio 25     CBC WITH DIFFERENTIAL - Abnormal    WBC 17.36 (*)     RBC 4.12 (*)     Hgb 10.4 (*)     Hct 32.5 (*)     MCV 78.9 (*)     MCH 25.2 (*)     MCHC 32.0 (*)     RDW 17.0      Platelet 318      MPV 9.8      Neut % 87.3      Lymph % 3.6      Mono % 7.5      Eos % 0.1      Basophil % 0.1      Imm Grans % 1.4      Neut # 15.16 (*)     Lymph # 0.63      Mono # 1.30      Eos # 0.01      Baso # 0.01      Imm Gran # 0.25 (*)    URINALYSIS, REFLEX TO URINE CULTURE - Abnormal    Color, UA Straw      Appearance, UA Clear      Specific Gravity, UA 1.015      pH, UA 6.0      Protein,  (*)     Glucose, UA Negative      Ketones, UA Negative      Blood, UA Moderate (*)     Bilirubin, UA Negative      Urobilinogen, UA 0.2      Nitrites, UA Negative      Leukocyte Esterase, UA Trace (*)    LACTIC ACID, PLASMA - Abnormal    Lactic Acid Level 2.9 (*)    MAGNESIUM - Abnormal    Magnesium Level 1.30 (*)    LACTIC ACID, PLASMA - Abnormal    Lactic Acid Level 3.1 (*)    URINALYSIS, MICROSCOPIC - Abnormal    Bacteria, UA Many (*)     RBC, UA 0-2      WBC, UA 11-20 (*)     Squamous Epithelial Cells, UA Few (*)    LIPASE - Normal    Lipase Level 7     OCCULT BLOOD, STOOL 1ST SPECIMEN - Normal    Occult Blood Stool 1 Negative     COVID/FLU A&B PCR - Normal    Influenza A PCR Not Detected      Influenza B PCR Not Detected      SARS-CoV-2 PCR Not Detected      Narrative:     The Xpert Xpress SARS-CoV-2/FLU/RSV plus is a rapid, multiplexed real-time PCR test intended for the simultaneous qualitative detection and differentiation of SARS-CoV-2, Influenza A, Influenza B, and respiratory syncytial virus (RSV) viral RNA in either nasopharyngeal swab or nasal swab specimens.         CULTURE, URINE   CBC  W/ AUTO DIFFERENTIAL    Narrative:     The following orders were created for panel order CBC auto differential.  Procedure                               Abnormality         Status                     ---------                               -----------         ------                     CBC with Differential[[id number]]       Abnormal            Final result                 Please view results for these tests on the individual orders.          Imaging Results              CT Abdomen Pelvis  Without Contrast (Final result)  Result time 03/30/25 18:59:56      Final result by Damien West MD (03/30/25 18:59:56)                   Impression:      Significantly limited study due the lack of contrast and motion.    Cholelithiasis without evidence of acute cholecystitis      Electronically signed by: Damien West MD  Date:    03/30/2025  Time:    18:59               Narrative:    EXAMINATION:  CT ABDOMEN PELVIS WITHOUT CONTRAST    CLINICAL HISTORY:  Abdominal pain, acute, nonlocalized;  bloody stools    TECHNIQUE:  Low dose axial images, sagittal and coronal reformations were obtained from the lung bases to the pubic symphysis.  No oral contrast is given.    Automatic exposure control (AEC) was utilized for dose reduction.    Dose: 186.73 mGycm    COMPARISON:  11/17/2023    FINDINGS:  There are very small bilateral pleural effusions liver appears normal.  There is cholelithiasis without definite evidence of acute cholecystitis.  Spleen appears normal.  Pancreas is not well visualized.  The adrenals are not well seen.  Kidneys appear normal.  Aorta shows calcification without an aneurysm present.                                       X-Ray Chest AP Portable (Final result)  Result time 03/30/25 18:09:43      Final result by Damien West MD (03/30/25 18:09:43)                   Impression:      No acute abnormalities are seen      Electronically signed by: Damien West MD  Date:    03/30/2025  Time:    18:09                Narrative:    EXAMINATION:  XR CHEST AP PORTABLE    CLINICAL HISTORY:  Weakness    TECHNIQUE:  Single frontal view of the chest was performed.    COMPARISON:  04/01/2024    FINDINGS:  No infiltrates are seen.  Heart is enlarged.  There is tortuosity and calcification of the aorta.                                       Medications   sodium chloride 0.9% flush 2 mL (has no administration in time range)   melatonin tablet 6 mg (has no administration in time range)   cefTRIAXone injection 1 g (has no administration in time range)   acetaminophen tablet 650 mg (has no administration in time range)   polyethylene glycol packet 17 g (has no administration in time range)   loperamide capsule 2 mg (has no administration in time range)   famotidine tablet 20 mg (has no administration in time range)   ondansetron injection 4 mg (has no administration in time range)   ondansetron injection 4 mg (has no administration in time range)   acetaminophen tablet 650 mg (has no administration in time range)   0.9% NaCl infusion (has no administration in time range)   potassium chloride SA CR tablet 20 mEq (20 mEq Oral Given 3/30/25 1811)   sodium chloride 0.9% bolus 500 mL 500 mL (0 mLs Intravenous Stopped 3/30/25 2021)   acetaminophen tablet 650 mg (650 mg Oral Given 3/30/25 1915)   magnesium sulfate in dextrose IVPB (premix) 1 g (0 g Intravenous Stopped 3/30/25 2021)   sodium chloride 0.9% bolus 500 mL 500 mL (0 mLs Intravenous Stopped 3/30/25 2146)   cefTRIAXone injection 1 g (1 g Intravenous Given 3/30/25 2126)   sodium chloride 0.9% bolus 500 mL 500 mL (0 mLs Intravenous Stopped 3/30/25 2328)     Medical Decision Making  Overall 89 yo F with hematuria, dysuria, weakness. On PE, epigastric TTP, normoactive bowel sounds, lungs CTA BL, heart RRR.  Differential Diagnosis:   GI bleed, electrolyte abnormality, IVVD, UTI  ED Management:  /62 on my exam, which is pt's baseline BP. Other VSSAF  On PE, epigastric TTP, normoactive  bowel sounds, lungs CTA BL, heart RRR.   Pt appears to be in no distress   CBC, CMP, UA, lipase, FOBT, Mg, CXR ordered. FOBT neg. Lipase 7. CBC with WBC 17. K 3.3. 20mEQ Kcl given.      Care of this pt turned over to night attending at 1800. Sign out given and all questions answered.      Amount and/or Complexity of Data Reviewed  Labs: ordered. Decision-making details documented in ED Course.  Radiology: ordered. Decision-making details documented in ED Course.    Risk  OTC drugs.  Prescription drug management.  Decision regarding hospitalization.  Risk Details:   I, DR. FUNES, ASSUMED CARE OF THIS PATIENT AT 6:00 P.M.; patient was normotensive with normal pulse at this time; her earlier temperature nancy to 101.4; she also had an elevation in her lactic acid at 2.9 but she also had evidence of dehydration; she received Rocephin for UTI in her blood pressure dipped into systolic of the 80s; she was given IV fluids and has been given a total of 1500 mL, cautiously given because of her age; patient is awake and alert and answering questions although she does have dementia; she does not appear to be septic although she does have fever and her blood pressure decreased; will admit after discussing with Dr. Arreguin and continue Rocephin 1 g Q 24 hours and will give normal saline at 75 mL/hour    Patient Vitals for the past 24 hrs:   BP Temp Temp src Pulse Resp SpO2   03/31/25 0027 (!) 123/56 (!) 100.9 °F (38.3 °C) Oral 87 20 --   03/30/25 2329 (!) 104/44 -- -- 76 18 97 %   03/30/25 2245 (!) 95/45 -- -- 79 (!) 26 97 %   03/30/25 2230 (!) 93/42 -- -- 78 (!) 21 97 %   03/30/25 2215 -- 100.1 °F (37.8 °C) Oral -- -- --   03/30/25 2210 (!) 94/43 -- -- 78 20 97 %   03/30/25 2150 (!) 102/43 -- -- 76 14 97 %   03/30/25 2130 (!) 117/54 -- -- 79 (!) 28 97 %   03/30/25 2120 (!) 101/58 -- -- 82 (!) 25 95 %   03/30/25 2100 (!) 84/45 -- -- 80 (!) 22 97 %   03/30/25 2050 (!) 89/44 -- -- 89 (!) 26 100 %   03/30/25 2030 (!) 90/44 -- -- 81  (!) 27 97 %   03/30/25 2020 (!) 85/39 (!) 100.9 °F (38.3 °C) Oral 89 20 96 %   03/30/25 1945 -- (!) 101.4 °F (38.6 °C) Oral -- -- --   03/30/25 1913 (!) 112/54 -- -- 84 19 100 %   03/30/25 1815 (!) 118/59 -- -- 86 (!) 22 95 %   03/30/25 1745 -- -- -- 85 (!) 23 98 %   03/30/25 1730 (!) 117/52 -- -- 91 (!) 22 --   03/30/25 1715 118/62 -- -- 85 17 --   03/30/25 1654 (!) 80/43 98.2 °F (36.8 °C) -- 86 20 95 %                ED Course as of 03/31/25 0030   Sun Mar 30, 2025   2108 BUN(!): 46.4 [DD]   2108 Creatinine(!): 1.88 [DD]   2108 Potassium(!): 3.3 [DD]   2108 Magnesium (!): 1.30 [DD]   2221 Bacteria, UA(!): Many [DD]   2221 WBC, UA(!): 11-20 [DD]      ED Course User Index  [DD] Seth Moyer MD                           Clinical Impression:  Final diagnoses:  [R53.1] Weakness  [N39.0] Acute UTI (Primary)  [E86.0] Dehydration          ED Disposition Condition    Observation Stable                    [1]   Social History  Tobacco Use    Smoking status: Never    Smokeless tobacco: Never   Substance Use Topics    Alcohol use: Never    Drug use: Never        Seth Moyer MD  03/31/25 0031

## 2025-03-30 NOTE — Clinical Note
Diagnosis: Acute UTI [088704]   Future Attending Provider: NICKIE DOHERTY [[id number]]   Special Needs:: No Special Needs [1]

## 2025-03-30 NOTE — ED NOTES
Patient reports had a bloody bowel movement  times one been having weakness for one month . Decrease appetitie

## 2025-03-31 PROBLEM — N39.0 ACUTE UTI: Status: ACTIVE | Noted: 2025-03-31

## 2025-03-31 LAB
ALBUMIN SERPL-MCNC: 2.4 G/DL (ref 3.4–4.8)
ALBUMIN/GLOB SERPL: 0.7 RATIO (ref 1.1–2)
ALP SERPL-CCNC: 81 UNIT/L (ref 40–150)
ALT SERPL-CCNC: 14 UNIT/L (ref 0–55)
ANION GAP SERPL CALC-SCNC: 9 MEQ/L
AST SERPL-CCNC: 22 UNIT/L (ref 11–45)
BASOPHILS # BLD AUTO: 0.01 X10(3)/MCL
BASOPHILS NFR BLD AUTO: 0.1 %
BILIRUB SERPL-MCNC: 0.5 MG/DL
BUN SERPL-MCNC: 30.8 MG/DL (ref 9.8–20.1)
CALCIUM SERPL-MCNC: 7.8 MG/DL (ref 8.4–10.2)
CHLORIDE SERPL-SCNC: 109 MMOL/L (ref 98–111)
CO2 SERPL-SCNC: 24 MMOL/L (ref 23–31)
CREAT SERPL-MCNC: 0.87 MG/DL (ref 0.55–1.02)
CREAT/UREA NIT SERPL: 35
EOSINOPHIL # BLD AUTO: 0.01 X10(3)/MCL (ref 0–0.9)
EOSINOPHIL NFR BLD AUTO: 0.1 %
ERYTHROCYTE [DISTWIDTH] IN BLOOD BY AUTOMATED COUNT: 16.8 % (ref 11.5–17)
GFR SERPLBLD CREATININE-BSD FMLA CKD-EPI: >60 ML/MIN/1.73/M2
GLOBULIN SER-MCNC: 3.5 GM/DL (ref 2.4–3.5)
GLUCOSE SERPL-MCNC: 118 MG/DL (ref 75–121)
GLUCOSE SERPL-MCNC: 136 MG/DL (ref 70–110)
HCT VFR BLD AUTO: 29.7 % (ref 37–47)
HGB BLD-MCNC: 9.4 G/DL (ref 12–16)
IMM GRANULOCYTES # BLD AUTO: 0.31 X10(3)/MCL (ref 0–0.04)
IMM GRANULOCYTES NFR BLD AUTO: 2.2 %
LACTATE SERPL-SCNC: 1 MMOL/L (ref 0.5–2.2)
LYMPHOCYTES # BLD AUTO: 0.51 X10(3)/MCL (ref 0.6–4.6)
LYMPHOCYTES NFR BLD AUTO: 3.7 %
MAGNESIUM SERPL-MCNC: 1.6 MG/DL (ref 1.6–2.6)
MCH RBC QN AUTO: 25 PG (ref 27–31)
MCHC RBC AUTO-ENTMCNC: 31.6 G/DL (ref 33–36)
MCV RBC AUTO: 79 FL (ref 80–94)
MONOCYTES # BLD AUTO: 1.15 X10(3)/MCL (ref 0.1–1.3)
MONOCYTES NFR BLD AUTO: 8.3 %
NEUTROPHILS # BLD AUTO: 11.9 X10(3)/MCL (ref 2.1–9.2)
NEUTROPHILS NFR BLD AUTO: 85.6 %
PHOSPHATE SERPL-MCNC: 2.3 MG/DL (ref 2.3–4.7)
PLATELET # BLD AUTO: 287 X10(3)/MCL (ref 130–400)
PMV BLD AUTO: 9.8 FL (ref 7.4–10.4)
POCT GLUCOSE: 136 MG/DL (ref 70–110)
POCT GLUCOSE: 142 MG/DL (ref 70–110)
POTASSIUM SERPL-SCNC: 3.3 MMOL/L (ref 3.5–5.1)
PROT SERPL-MCNC: 5.9 GM/DL (ref 5.8–7.6)
RBC # BLD AUTO: 3.76 X10(6)/MCL (ref 4.2–5.4)
SODIUM SERPL-SCNC: 142 MMOL/L (ref 136–145)
WBC # BLD AUTO: 13.89 X10(3)/MCL (ref 4.5–11.5)

## 2025-03-31 PROCEDURE — 83735 ASSAY OF MAGNESIUM: CPT | Performed by: INTERNAL MEDICINE

## 2025-03-31 PROCEDURE — 97162 PT EVAL MOD COMPLEX 30 MIN: CPT

## 2025-03-31 PROCEDURE — 11000001 HC ACUTE MED/SURG PRIVATE ROOM

## 2025-03-31 PROCEDURE — 25000003 PHARM REV CODE 250: Performed by: SPECIALIST

## 2025-03-31 PROCEDURE — 99900035 HC TECH TIME PER 15 MIN (STAT)

## 2025-03-31 PROCEDURE — 63600175 PHARM REV CODE 636 W HCPCS: Performed by: PHYSICIAN ASSISTANT

## 2025-03-31 PROCEDURE — 97165 OT EVAL LOW COMPLEX 30 MIN: CPT

## 2025-03-31 PROCEDURE — 94799 UNLISTED PULMONARY SVC/PX: CPT

## 2025-03-31 PROCEDURE — 36415 COLL VENOUS BLD VENIPUNCTURE: CPT | Performed by: INTERNAL MEDICINE

## 2025-03-31 PROCEDURE — 96366 THER/PROPH/DIAG IV INF ADDON: CPT

## 2025-03-31 PROCEDURE — 63600175 PHARM REV CODE 636 W HCPCS: Performed by: SPECIALIST

## 2025-03-31 PROCEDURE — 80053 COMPREHEN METABOLIC PANEL: CPT | Performed by: INTERNAL MEDICINE

## 2025-03-31 PROCEDURE — 94760 N-INVAS EAR/PLS OXIMETRY 1: CPT

## 2025-03-31 PROCEDURE — 25000003 PHARM REV CODE 250: Performed by: FAMILY MEDICINE

## 2025-03-31 PROCEDURE — 96361 HYDRATE IV INFUSION ADD-ON: CPT

## 2025-03-31 PROCEDURE — 85025 COMPLETE CBC W/AUTO DIFF WBC: CPT | Performed by: INTERNAL MEDICINE

## 2025-03-31 PROCEDURE — 87040 BLOOD CULTURE FOR BACTERIA: CPT | Mod: 91 | Performed by: INTERNAL MEDICINE

## 2025-03-31 PROCEDURE — 25000003 PHARM REV CODE 250: Performed by: PHYSICIAN ASSISTANT

## 2025-03-31 PROCEDURE — 27000221 HC OXYGEN, UP TO 24 HOURS

## 2025-03-31 PROCEDURE — S4991 NICOTINE PATCH NONLEGEND: HCPCS | Performed by: PHYSICIAN ASSISTANT

## 2025-03-31 PROCEDURE — 83605 ASSAY OF LACTIC ACID: CPT | Performed by: INTERNAL MEDICINE

## 2025-03-31 PROCEDURE — 84100 ASSAY OF PHOSPHORUS: CPT | Performed by: INTERNAL MEDICINE

## 2025-03-31 RX ORDER — HYDROCODONE BITARTRATE AND ACETAMINOPHEN 5; 325 MG/1; MG/1
1 TABLET ORAL EVERY 6 HOURS PRN
Refills: 0 | Status: DISCONTINUED | OUTPATIENT
Start: 2025-03-31 | End: 2025-04-02 | Stop reason: HOSPADM

## 2025-03-31 RX ORDER — ALENDRONATE SODIUM 70 MG/1
70 TABLET ORAL
COMMUNITY

## 2025-03-31 RX ORDER — NALOXONE HCL 0.4 MG/ML
0.02 VIAL (ML) INJECTION
Status: DISCONTINUED | OUTPATIENT
Start: 2025-03-31 | End: 2025-04-02 | Stop reason: HOSPADM

## 2025-03-31 RX ORDER — POTASSIUM CHLORIDE 20 MEQ/1
20 TABLET, EXTENDED RELEASE ORAL 2 TIMES DAILY
Status: COMPLETED | OUTPATIENT
Start: 2025-03-31 | End: 2025-03-31

## 2025-03-31 RX ORDER — DICLOFENAC SODIUM 10 MG/G
2 GEL TOPICAL 2 TIMES DAILY
Status: DISCONTINUED | OUTPATIENT
Start: 2025-03-31 | End: 2025-04-02 | Stop reason: HOSPADM

## 2025-03-31 RX ORDER — ONDANSETRON 4 MG/1
8 TABLET, ORALLY DISINTEGRATING ORAL EVERY 8 HOURS PRN
Status: DISCONTINUED | OUTPATIENT
Start: 2025-03-31 | End: 2025-04-02 | Stop reason: HOSPADM

## 2025-03-31 RX ORDER — MAGNESIUM SULFATE HEPTAHYDRATE 40 MG/ML
2 INJECTION, SOLUTION INTRAVENOUS ONCE
Status: COMPLETED | OUTPATIENT
Start: 2025-03-31 | End: 2025-03-31

## 2025-03-31 RX ORDER — LOSARTAN POTASSIUM 100 MG/1
100 TABLET ORAL DAILY
COMMUNITY

## 2025-03-31 RX ORDER — HYDROCHLOROTHIAZIDE 25 MG/1
25 TABLET ORAL DAILY
COMMUNITY

## 2025-03-31 RX ORDER — LOSARTAN POTASSIUM 50 MG/1
100 TABLET ORAL DAILY
Status: DISCONTINUED | OUTPATIENT
Start: 2025-03-31 | End: 2025-04-02 | Stop reason: HOSPADM

## 2025-03-31 RX ORDER — INSULIN ASPART 100 [IU]/ML
0-5 INJECTION, SOLUTION INTRAVENOUS; SUBCUTANEOUS
Status: DISCONTINUED | OUTPATIENT
Start: 2025-03-31 | End: 2025-04-02 | Stop reason: HOSPADM

## 2025-03-31 RX ORDER — IPRATROPIUM BROMIDE AND ALBUTEROL SULFATE 2.5; .5 MG/3ML; MG/3ML
3 SOLUTION RESPIRATORY (INHALATION) EVERY 6 HOURS PRN
Status: DISCONTINUED | OUTPATIENT
Start: 2025-03-31 | End: 2025-04-02 | Stop reason: HOSPADM

## 2025-03-31 RX ORDER — ACETAMINOPHEN 325 MG/1
650 TABLET ORAL EVERY 4 HOURS PRN
Status: DISCONTINUED | OUTPATIENT
Start: 2025-03-31 | End: 2025-04-02 | Stop reason: HOSPADM

## 2025-03-31 RX ORDER — CETIRIZINE HYDROCHLORIDE 10 MG/1
10 TABLET ORAL DAILY
COMMUNITY

## 2025-03-31 RX ORDER — HYDROCHLOROTHIAZIDE 12.5 MG/1
25 TABLET ORAL DAILY
Status: DISCONTINUED | OUTPATIENT
Start: 2025-03-31 | End: 2025-04-02 | Stop reason: HOSPADM

## 2025-03-31 RX ORDER — POLYETHYLENE GLYCOL 3350 17 G/17G
17 POWDER, FOR SOLUTION ORAL 3 TIMES DAILY PRN
Status: DISCONTINUED | OUTPATIENT
Start: 2025-03-31 | End: 2025-04-02 | Stop reason: HOSPADM

## 2025-03-31 RX ORDER — TALC
9 POWDER (GRAM) TOPICAL NIGHTLY PRN
Status: DISCONTINUED | OUTPATIENT
Start: 2025-03-31 | End: 2025-04-02 | Stop reason: HOSPADM

## 2025-03-31 RX ORDER — METFORMIN HYDROCHLORIDE EXTENDED-RELEASE TABLETS 500 MG/1
500 TABLET, FILM COATED, EXTENDED RELEASE ORAL
COMMUNITY

## 2025-03-31 RX ORDER — IBUPROFEN 200 MG
16 TABLET ORAL
Status: DISCONTINUED | OUTPATIENT
Start: 2025-03-31 | End: 2025-04-02 | Stop reason: HOSPADM

## 2025-03-31 RX ORDER — CETIRIZINE HYDROCHLORIDE 10 MG/1
10 TABLET ORAL DAILY
Status: DISCONTINUED | OUTPATIENT
Start: 2025-03-31 | End: 2025-04-02 | Stop reason: HOSPADM

## 2025-03-31 RX ORDER — ONDANSETRON HYDROCHLORIDE 2 MG/ML
4 INJECTION, SOLUTION INTRAVENOUS EVERY 8 HOURS PRN
Status: DISCONTINUED | OUTPATIENT
Start: 2025-03-31 | End: 2025-04-02 | Stop reason: HOSPADM

## 2025-03-31 RX ORDER — PRAVASTATIN SODIUM 20 MG/1
20 TABLET ORAL DAILY
COMMUNITY

## 2025-03-31 RX ORDER — PRAVASTATIN SODIUM 10 MG/1
20 TABLET ORAL DAILY
Status: DISCONTINUED | OUTPATIENT
Start: 2025-03-31 | End: 2025-04-02 | Stop reason: HOSPADM

## 2025-03-31 RX ORDER — SODIUM CHLORIDE 0.9 % (FLUSH) 0.9 %
10 SYRINGE (ML) INJECTION
Status: DISCONTINUED | OUTPATIENT
Start: 2025-03-31 | End: 2025-04-02 | Stop reason: HOSPADM

## 2025-03-31 RX ORDER — GLUCAGON 1 MG
1 KIT INJECTION
Status: DISCONTINUED | OUTPATIENT
Start: 2025-03-31 | End: 2025-04-02 | Stop reason: HOSPADM

## 2025-03-31 RX ORDER — SIMETHICONE 80 MG
1 TABLET,CHEWABLE ORAL 4 TIMES DAILY PRN
Status: DISCONTINUED | OUTPATIENT
Start: 2025-03-31 | End: 2025-04-02 | Stop reason: HOSPADM

## 2025-03-31 RX ORDER — MORPHINE SULFATE 4 MG/ML
2 INJECTION, SOLUTION INTRAMUSCULAR; INTRAVENOUS EVERY 6 HOURS PRN
Refills: 0 | Status: DISCONTINUED | OUTPATIENT
Start: 2025-03-31 | End: 2025-04-02 | Stop reason: HOSPADM

## 2025-03-31 RX ORDER — ENOXAPARIN SODIUM 100 MG/ML
40 INJECTION SUBCUTANEOUS EVERY 24 HOURS
Status: DISCONTINUED | OUTPATIENT
Start: 2025-03-31 | End: 2025-04-02 | Stop reason: HOSPADM

## 2025-03-31 RX ORDER — BISACODYL 10 MG/1
10 SUPPOSITORY RECTAL DAILY PRN
Status: DISCONTINUED | OUTPATIENT
Start: 2025-03-31 | End: 2025-04-02 | Stop reason: HOSPADM

## 2025-03-31 RX ORDER — ALUMINUM HYDROXIDE, MAGNESIUM HYDROXIDE, AND SIMETHICONE 1200; 120; 1200 MG/30ML; MG/30ML; MG/30ML
30 SUSPENSION ORAL 4 TIMES DAILY PRN
Status: DISCONTINUED | OUTPATIENT
Start: 2025-03-31 | End: 2025-04-02 | Stop reason: HOSPADM

## 2025-03-31 RX ORDER — ERGOCALCIFEROL 1.25 MG/1
50000 CAPSULE ORAL
COMMUNITY

## 2025-03-31 RX ORDER — IBUPROFEN 200 MG
24 TABLET ORAL
Status: DISCONTINUED | OUTPATIENT
Start: 2025-03-31 | End: 2025-04-02 | Stop reason: HOSPADM

## 2025-03-31 RX ORDER — NICOTINE 7MG/24HR
1 PATCH, TRANSDERMAL 24 HOURS TRANSDERMAL DAILY
Status: DISCONTINUED | OUTPATIENT
Start: 2025-03-31 | End: 2025-04-02 | Stop reason: HOSPADM

## 2025-03-31 RX ADMIN — SODIUM CHLORIDE 1000 ML: 9 INJECTION, SOLUTION INTRAVENOUS at 12:03

## 2025-03-31 RX ADMIN — CETIRIZINE HYDROCHLORIDE 10 MG: 10 TABLET, FILM COATED ORAL at 03:03

## 2025-03-31 RX ADMIN — ENOXAPARIN SODIUM 40 MG: 40 INJECTION SUBCUTANEOUS at 06:03

## 2025-03-31 RX ADMIN — POLYETHYLENE GLYCOL 3350 17 G: 17 POWDER, FOR SOLUTION ORAL at 08:03

## 2025-03-31 RX ADMIN — POTASSIUM CHLORIDE 20 MEQ: 1500 TABLET, EXTENDED RELEASE ORAL at 09:03

## 2025-03-31 RX ADMIN — FAMOTIDINE 20 MG: 20 TABLET, FILM COATED ORAL at 08:03

## 2025-03-31 RX ADMIN — POTASSIUM CHLORIDE 20 MEQ: 1500 TABLET, EXTENDED RELEASE ORAL at 10:03

## 2025-03-31 RX ADMIN — PRAVASTATIN SODIUM 20 MG: 10 TABLET ORAL at 03:03

## 2025-03-31 RX ADMIN — ACETAMINOPHEN 650 MG: 325 TABLET ORAL at 11:03

## 2025-03-31 RX ADMIN — FAMOTIDINE 20 MG: 20 TABLET, FILM COATED ORAL at 10:03

## 2025-03-31 RX ADMIN — DICLOFENAC SODIUM 2 G: 10 GEL TOPICAL at 10:03

## 2025-03-31 RX ADMIN — NICOTINE 1 PATCH: 7 PATCH, EXTENDED RELEASE TRANSDERMAL at 01:03

## 2025-03-31 RX ADMIN — ACETAMINOPHEN 650 MG: 325 TABLET ORAL at 05:03

## 2025-03-31 RX ADMIN — CEFTRIAXONE SODIUM 1 G: 1 INJECTION, POWDER, FOR SOLUTION INTRAMUSCULAR; INTRAVENOUS at 06:03

## 2025-03-31 RX ADMIN — MAGNESIUM SULFATE HEPTAHYDRATE 2 G: 40 INJECTION, SOLUTION INTRAVENOUS at 09:03

## 2025-03-31 NOTE — PT/OT/SLP EVAL
"Occupational Therapy   Evaluation    Name: Yifan Easley  MRN: [id number]  Admitting Diagnosis: Acute UTI  Recent Surgery: * No surgery found *      Recommendations:     Discharge Recommendations: Low Intensity Therapy  Discharge Equipment Recommendations:  none  Barriers to discharge:  None    Assessment:   Pt is a 89-year-old female presenting with blood in her urine and generalized weakness. She has experienced weakness for about a month. Pt also endorsing dysuria. Pt denies f/c/n/v/d/c, melena, hematochezia, CP, SOB, dizziness. Performance deficits affecting function: weakness, impaired self care skills, decreased safety awareness, impaired endurance, gait instability, impaired balance, impaired functional mobility.      Rehab Prognosis: Good; patient would benefit from acute skilled OT services to address these deficits and reach maximum level of function.       Plan:     Patient to be seen daily (QD) to address the above listed problems via self-care/home management, therapeutic activities, therapeutic exercises  Plan of Care Expires: 04/14/25  Plan of Care Reviewed with: patient, son    Subjective     Chief Complaint: "I feel better today"; "I'm missing my cigarettes"  Patient/Family Comments/goals: pt son reports wanting sitters to DC pt back home    Occupational Profile:  Living Environment: lives alone in  home with 3 steps to enter (+) R hand rail; tub/shower combo  Previous level of function: independent in ADLs  Equipment Used at Home: walker, rolling, cane, straight  Assistance upon Discharge: family and sitters once set up     Pain/Comfort:  Pain Rating 1: 0/10    Patients cultural, spiritual, Jew conflicts given the current situation: no    Objective:     Communicated with: RN prior to session.  Patient found supine with bed alarm, peripheral IV, oxygen upon OT entry to room.    General Precautions: Standard, fall  Orthopedic Precautions: N/A  Braces: N/A  Respiratory Status: Nasal cannula, " flow 2 L/min - pt performed evaluation on room air with oxygen remaining WNL and no SOB reported    Occupational Performance:    Bed Mobility:    Patient completed Supine to Sit with supervision    Functional Mobility/Transfers:  Patient completed Sit <> Stand Transfer with contact guard assistance  with  rolling walker   Patient completed Toilet Transfer Stand Pivot technique with contact guard assistance with  rolling walker  Functional Mobility: 2x50 feet with RW; seated rest break between sets due to fatigue     Activities of Daily Living:  Lower Body Dressing: minimum assistance    Toileting: contact guard assistance      Cognitive/Visual Perceptual:  Cognitive/Psychosocial Skills:     -       Oriented to: Person, Place, Time, and Situation     Physical Exam:  Upper Extremity Range of Motion:     -       Right Upper Extremity: WFL  -       Left Upper Extremity: WFL  Upper Extremity Strength:    -       Right Upper Extremity: WFL  -       Left Upper Extremity: WFL    AMPAC 6 Click ADL:  AMPAC Total Score: 21    Treatment & Education:  Therapist provided facilitation and instruction of proper body mechanics, energy conservation, and fall prevention strategies during tasks listed above.  Patient educated on role of OT, POC and goals for therapy  Patient educated on importance of OOB activities with staff member assistance and sitting OOB majority of the day.     Patient clear to ambulate to/from bathroom with RN/PCT, assist x1 person with RW and gait belt; supervise toileting .    Patient left up in chair with all lines intact, call button in reach, RN notified, chair alarm on, and family present.      GOALS:   Multidisciplinary Problems       Occupational Therapy Goals          Problem: Occupational Therapy    Goal Priority Disciplines Outcome Interventions   Occupational Therapy Goal     OT, PT/OT Progressing    Description: Goals to be met by: 4/14/25     Patient will increase functional independence with ADLs  by performing:    LE Dressing with Supervision.  Toileting from toilet with Supervision for hygiene and clothing management.   Toilet transfer to toilet with Supervision.                         DME Justifications:  No DME recommended requiring DME justifications    History:     Past Medical History:   Diagnosis Date    Diabetes mellitus     GERD (gastroesophageal reflux disease)     Hypertension     Pancreatic insufficiency        No past surgical history on file.    Time Tracking:     OT Date of Treatment: 03/31/25  OT Start Time: 0900  OT Stop Time: 0935  OT Total Time (min): 35 min    Billable Minutes:Evaluation 35 min    3/31/2025

## 2025-03-31 NOTE — PLAN OF CARE
Problem: Adult Inpatient Plan of Care  Goal: Plan of Care Review  Outcome: Progressing  Flowsheets (Taken 3/31/2025 0030)  Plan of Care Reviewed With: (Son Vinnie present)   patient   child  Goal: Patient-Specific Goal (Individualized)  Outcome: Progressing  Flowsheets (Taken 3/31/2025 0030)  Individualized Care Needs:   Safety with mobility to prevent injury   Monitor for s/s of infection worsening   IV hydration   Accurate I&O   Pain management.  Anxieties, Fears or Concerns: Admission to hospital, staying here to get better  Patient/Family-Specific Goals (Include Timeframe): Improved condition by WENDY for return home with self/family care.  Goal: Absence of Hospital-Acquired Illness or Injury  Outcome: Progressing  Intervention: Prevent Skin Injury  Flowsheets (Taken 3/31/2025 0030)  Device Skin Pressure Protection:   absorbent pad utilized/changed   tubing/devices free from skin contact  Goal: Optimal Comfort and Wellbeing  Outcome: Progressing  Goal: Readiness for Transition of Care  Outcome: Progressing  Intervention: Mutually Develop Transition Plan  Flowsheets (Taken 3/31/2025 0030)  Equipment Currently Used at Home: none  Transportation Anticipated: family or friend will provide  Who are your caregiver(s) and their phone number(s)?: Vinnie (son) [phone]  Communicated WENDY with patient/caregiver: Date not available/Unable to determine  Do you expect to return to your current living situation?: Yes  Do you have help at home or someone to help you manage your care at home?: Yes  Readmission within 30 days?: No  Do you currently have service(s) that help you manage your care at home?: No     Problem: Fall Injury Risk  Goal: Absence of Fall and Fall-Related Injury  Outcome: Progressing  Intervention: Identify and Manage Contributors  Flowsheets (Taken 3/31/2025 0030)  Self-Care Promotion:   independence encouraged   BADL personal objects within reach   BADL personal routines maintained     Problem: UTI  (Urinary Tract Infection)  Goal: Improved Infection Symptoms  Outcome: Progressing  Intervention: Prevent Infection Progression  Flowsheets (Taken 3/31/2025 0030)  Infection Management: aseptic technique maintained  Intervention: Facilitate Optimal Urinary Elimination  Flowsheets (Taken 3/31/2025 0030)  Urinary Elimination Promotion: (External catheter) absorbent pad/diaper use encouraged     Problem: Diabetes  Goal: Blood Glucose Level Within Target Range  Outcome: Progressing  Intervention: Optimize Glycemic Control  Flowsheets (Taken 3/31/2025 0030)  Hyperglycemia Management: blood glucose monitored  Goal: Minimize Risk of Hypoglycemia  Outcome: Progressing  Intervention: Management and Risk Reduction of Hypoglycemia  Flowsheets (Taken 3/31/2025 0030)  Hypoglycemia Management: blood glucose monitored     Problem: Comorbidity Management  Goal: Blood Pressure in Desired Range  Outcome: Progressing

## 2025-03-31 NOTE — PLAN OF CARE
Problem: Occupational Therapy  Goal: Occupational Therapy Goal  Description: Goals to be met by: 4/14/25     Patient will increase functional independence with ADLs by performing:    LE Dressing with Supervision.  Toileting from toilet with Supervision for hygiene and clothing management.   Toilet transfer to toilet with Supervision.    Outcome: Progressing

## 2025-03-31 NOTE — PT/OT/SLP EVAL
Physical Therapy Evaluation (Inpatient)    Patient Name: Yifan Easley   MRN: [id number]  Recent Surgery: * No surgery found *      Recommendations:     Discharge Recommendations: Low Intensity Therapy   Discharge Equipment Recommendations: none   Barriers to discharge: Decreased caregiver support    Assessment:     Yifan Easley is a 89 y.o. female admitted with a medical diagnosis of Acute UTI. She initially presented to the ER for progressive weakness and blood in her urine. Patient was noted to be hypotensive upon arrival and ended up developing a fever. Labs significant for leukocytosis, anemia, hypokalemia, JAVIER, hypomagnesemia, and UTI. She has a past medical history significant for: HTN, DM2, chronic tobacco use. Unknown of any other past medical history as patient is somewhat a poor historian. Patient reports she previously did not require an assistive device for mobility although did have history of falls. Thus she will likely need a rolling walker upon discharge. Family is also requesting sitter services. Patient will see patient once per day until medically stable for discharge or until appropriate discharge plan achieved. She presents with the following impairments/functional limitations: weakness, impaired functional mobility, gait instability, impaired balance, decreased lower extremity function.     Rehab Prognosis: Good; patient would benefit from acute PT services to address these deficits and reach maximum level of function.    Plan:     During this hospitalization, patient to be seen  (1 time per day for 5-6 days per week) to address the above listed problems via gait training, therapeutic activities, therapeutic exercises, neuromuscular re-education    Plan of Care Expires: 04/14/25    Subjective     Chief Complaint: missing her cigarettes  Patient Comments/Goals: family requesting sitters when discharged  Pain/Comfort:  Pain Rating 1: 0/10    Social History:  Living Environment: Patient lives  alone in a single story home with number of outside stair(s): 3 with R handrail and tub only  Prior Level of Function: Prior to admission, patient was independent  Equipment Used at Home: walker, rolling, cane, straight  DME owned (not currently used): rolling walker and single point cane  Assistance Upon Discharge: unknown    Objective:     Communicated with OT prior to session. Patient found HOB elevated with bed alarm, peripheral IV, oxygen upon PT entry to room.    General Precautions: Standard, fall   Orthopedic Precautions: N/A   Braces: N/A    Respiratory Status: Nasal cannula, flow 2 L/min - entire session performed on room air with vitals WNL.    Exams:  Cognition: Patient is oriented to Person, Place, Time, Situation  RLE ROM: WFL  RLE Strength: Deficits: 4-/5  LLE ROM: WFL  LLE Strength: Deficits: 4-/5    Functional Mobility:  Gait belt applied - No  Bed Mobility  Supine to Sit: contact guard assistance for trunk management  Transfers  Sit to Stand: minimum assistance with rolling walker  Gait  Patient ambulated 2 x 50 ft with rolling walker and contact guard assistance and minimum assistance. Patient demonstrates occasional unsteady gait, decreased step length, decreased foot clearance, flexed posture, and decreased joe. All lines remained intact throughout ambulation trail, chair follow for patient safety, and portable monitor utilized.  Balance  Sitting: stand by assistance  Standing: contact guard assistance    Therapeutic Activities and Exercises:   Patient educated on role of acute care PT and PT POC, safety while in hospital including calling nurse for mobility, and call light usage  Patient educated about importance of OOB mobility and remaining up in chair most of the day.  Ok to bathroom with staff - Min A with RW.    AM-PAC 6 CLICK MOBILITY  Total Score:18    Patient left up in chair with all lines intact, call button in reach, chair alarm on, and family present.    GOALS:    Multidisciplinary Problems       Physical Therapy Goals          Problem: Physical Therapy    Goal Priority Disciplines Outcome Interventions   Physical Therapy Goal     PT, PT/OT Progressing    Description: Goals to be met by: discharge     Patient will increase functional independence with mobility by performin. Bed to chair transfer with Stand-by Assistance using Rolling Walker.  2. Gait  x 150 feet with Stand-by Assistance using Rolling Walker.                        DME Justifications:    Yifan's mobility limitation cannot be sufficiently resolved by the use of a cane. Her functional mobility deficit can be sufficiently resolved with the use of a Maida Rolling Walker. Patient's mobility limitation significantly impairs their ability to participate in one of more activities of daily living. The use of a maida RW will significantly improve the patient's ability to participate in MRADLS and the patient will use it on regular basis in the home.      History:     Past Medical History:   Diagnosis Date    Diabetes mellitus     GERD (gastroesophageal reflux disease)     Hypertension     Pancreatic insufficiency        No past surgical history on file.    Time Tracking:     PT Received On: 25  PT Start Time: 0900  PT Stop Time: 0930  PT Total Time (min): 30 min     Billable Minutes: Evaluation Moderate Complexity - 30 min    3/31/2025

## 2025-03-31 NOTE — PLAN OF CARE
Problem: Adult Inpatient Plan of Care  Goal: Plan of Care Review  Outcome: Progressing  Goal: Patient-Specific Goal (Individualized)  Outcome: Progressing  Goal: Absence of Hospital-Acquired Illness or Injury  Outcome: Progressing  Intervention: Identify and Manage Fall Risk  Flowsheets (Taken 3/31/2025 1031)  Safety Promotion/Fall Prevention:   assistive device/personal item within reach   bed alarm set   Fall Risk reviewed with patient/family   Fall Risk signage in place   family expresses understanding of fall risk and prevention   family to remain at bedside   instructed to call staff for mobility   nonskid shoes/socks when out of bed   patient expresses understanding of fall risk and prevention   room near unit station   side rails raised x 3  Intervention: Prevent Skin Injury  Flowsheets (Taken 3/31/2025 1031)  Body Position:   position changed independently   upper extremity elevated   weight shifting  Skin Protection:   incontinence pads utilized   skin sealant/moisture barrier applied  Device Skin Pressure Protection:   absorbent pad utilized/changed   tubing/devices free from skin contact  Intervention: Prevent and Manage VTE (Venous Thromboembolism) Risk  Flowsheets (Taken 3/31/2025 1031)  VTE Prevention/Management:   ambulation promoted   bleeding precautions maintained   dorsiflexion/plantar flexion performed   fluids promoted   ROM (active) performed  Intervention: Prevent Infection  Flowsheets (Taken 3/31/2025 1031)  Infection Prevention:   environmental surveillance performed   hand hygiene promoted   rest/sleep promoted   single patient room provided  Goal: Optimal Comfort and Wellbeing  Outcome: Progressing  Intervention: Monitor Pain and Promote Comfort  Flowsheets (Taken 3/31/2025 1031)  Pain Management Interventions:   pain management plan reviewed with patient/caregiver   pillow support provided   quiet environment facilitated   relaxation techniques promoted  Intervention: Provide  Person-Centered Care  Flowsheets (Taken 3/31/2025 1031)  Trust Relationship/Rapport:   care explained   choices provided   emotional support provided   empathic listening provided   questions answered   questions encouraged   reassurance provided   thoughts/feelings acknowledged  Goal: Readiness for Transition of Care  Outcome: Progressing  Intervention: Mutually Develop Transition Plan  Flowsheets (Taken 3/31/2025 1031)  Equipment Currently Used at Home: (patient has equipment however she does not use it)   cane, straight   walker, rolling   other (see comments)  Transportation Anticipated: family or friend will provide  Who are your caregiver(s) and their phone number(s)?: Vinnie (son) [phone]  Communicated WENDY with patient/caregiver: Date not available/Unable to determine  Do you expect to return to your current living situation?: Yes  Do you have help at home or someone to help you manage your care at home?: Yes  Readmission within 30 days?: No  Do you currently have service(s) that help you manage your care at home?: No     Problem: Fall Injury Risk  Goal: Absence of Fall and Fall-Related Injury  Outcome: Progressing  Intervention: Identify and Manage Contributors  Flowsheets (Taken 3/31/2025 1031)  Self-Care Promotion:   independence encouraged   BADL personal objects within reach   BADL personal routines maintained   meal set-up provided   adaptive equipment use encouraged  Medication Review/Management:   medications reviewed   high-risk medications identified  Intervention: Promote Injury-Free Environment  Flowsheets (Taken 3/31/2025 1031)  Safety Promotion/Fall Prevention:   assistive device/personal item within reach   bed alarm set   Fall Risk reviewed with patient/family   Fall Risk signage in place   family expresses understanding of fall risk and prevention   family to remain at bedside   instructed to call staff for mobility   nonskid shoes/socks when out of bed   patient expresses understanding  of fall risk and prevention   room near unit station   side rails raised x 3     Problem: UTI (Urinary Tract Infection)  Goal: Improved Infection Symptoms  Outcome: Progressing  Intervention: Prevent Infection Progression  Flowsheets (Taken 3/31/2025 1031)  Fever Reduction/Comfort Measures:   lightweight bedding   lightweight clothing  Infection Management: aseptic technique maintained  Intervention: Facilitate Optimal Urinary Elimination  Flowsheets (Taken 3/31/2025 1031)  Urinary Elimination Promotion:   absorbent pad/diaper use encouraged   frequent voiding encouraged     Problem: Diabetes  Goal: Optimal Coping  Outcome: Progressing  Intervention: Support Wellbeing and Self-Management Success  Flowsheets (Taken 3/31/2025 1031)  Supportive Measures:   active listening utilized   relaxation techniques promoted   self-care encouraged   self-reflection promoted   self-responsibility promoted   verbalization of feelings encouraged  Family/Support System Care:   involvement promoted   presence promoted   self-care encouraged   support provided  Goal: Optimal Functional Ability  Outcome: Progressing  Intervention: Optimize Functional Ability  Flowsheets (Taken 3/31/2025 1031)  Self-Care Promotion:   independence encouraged   BADL personal objects within reach   BADL personal routines maintained   meal set-up provided   adaptive equipment use encouraged  Activity Management:   Rolling - L1   Walk with assistive devise and /or staff member - L3  Activity Assistance Provided: assistance, 2 people  Goal: Blood Glucose Level Within Target Range  Outcome: Progressing  Intervention: Optimize Glycemic Control  Flowsheets (Taken 3/31/2025 1031)  Hyperglycemia Management: blood glucose monitored  Goal: Minimize Risk of Hypoglycemia  Outcome: Progressing  Intervention: Management and Risk Reduction of Hypoglycemia  Flowsheets (Taken 3/31/2025 1031)  Hypoglycemia Management: blood glucose monitored     Problem: Comorbidity  Management  Goal: Blood Pressure in Desired Range  Outcome: Progressing  Intervention: Maintain Blood Pressure Management  Flowsheets (Taken 3/31/2025 1031)  Medication Review/Management:   medications reviewed   high-risk medications identified

## 2025-03-31 NOTE — H&P
[facility]  [facility] MEDICINE - H&P ADMISSION NOTE    Patient Name: Yifan Easley  MRN: [id number]  Patient Class: OP- Observation   Admission Date: 3/30/2025   Admitting Physician: HM Service   Attending Physician: TITI Vasquez  Primary Care Provider: Malorie Primary Doctor  Face-to-Face encounter date: 03/31/2025      CHIEF COMPLAINT     Chief Complaint   Patient presents with    Abdominal Pain     Pt reports of severe abd pain with bloody stools and weakness that has been ongoing for a few days.      HISTORY OF PRESENTING ILLNESS     Patient is a 89-year-old female with a past medical history of HTN, DM2, chronic tobacco use, and unknown any other past medical history who has a somewhat poor historian and presented to the ER for progressive weakness.  Patient denies any complaints upon exam.  Patient was noted to be hypotensive upon arrival and ended up developing a fever. Labs significant for leukocytosis, anemia, hypokalemia, JAVIER, hypomagnesemia, and UTI .  Chest x-ray without acute abnormalities.  CTA abdomen and pelvis limited due to lack of contrast and motion cholelithiasis without evidence of acute cholecystitis.  Patient was admitted to Hospital Medicine Service and started on Rocephin.  Leukocytosis improved today as well as magnesium and renal function.  Urine culture pending, blood cultures pending.  Patient reports she lives at home alone and does not require any assistive device.    PAST MEDICAL HISTORY     Past Medical History:   Diagnosis Date    Diabetes mellitus     GERD (gastroesophageal reflux disease)     Hypertension     Pancreatic insufficiency      PAST SURGICAL HISTORY     No past surgical history on file.    FAMILY HISTORY     Reviewed and noncontributory to this case    SOCIAL HISTORY   Social History[1]    Patient's screen for food insecurity, housing instability, transportation needs, utility difficulties, and interpersonal safety. Social work consulted  for discharge planning.      HOME MEDICATIONS     Prior to Admission medications    Medication Sig Start Date End Date Taking? Authorizing Provider   diclofenac sodium (VOLTAREN) 1 % Gel Apply 2 g topically once daily. 11/17/23 12/17/23  Frida Hamilton MD     ALLERGIES     Patient has no known allergies.    REVIEW OF SYSTEMS     Except as documented above, all other systems reviewed and negative    PHYSICAL EXAM     Vitals:    03/31/25 1131   BP:    Pulse:    Resp:    Temp: (!) 101.8 °F (38.8 °C)        General:  In no acute distress, resting comfortably  Head and neck:  Atraumatic, normocephalic, moist mucous membranes, supple neck  Chest:  Clear to auscultation bilaterally  Heart:  S1, S2, no appreciable murmur  Abdomen:  Soft, nontender, BS +  MSK:  Warm, no lower extremity edema, no clubbing or cyanosis  Neuro:  Alert and oriented, moving all extremities with good strength  Integumentary:  No obvious skin rash  Psychiatry:  Appropriate mood and affect    ASSESSMENT AND PLAN     Severe sepsis 2/2 UTI POA  Lactic acidosis resolved   Continue empiric Rocephin  Urine culture pending  Blood cultures pending     Hypotension with hx of HTN  Hold all antihypertensives   Continue IVF     Hypokalemia  Hypomagnesemia   Replete as condition requires     Anemia   H&H slightly below baseline possibly 2/2 hemodilution - monitor     JAVIER - improving   Continue IVF     Weakness  PT/OT     DM2   SSI     Chronic tobacco use   Smokes 4 cigarettes a day   Nicotine patch     Nurse verifying home medication regimen     DVT prophylaxis:  Lovenox    Admit this patient to observation under my care.  __________________________________________________________________  LABS/MICRO/MEDS/DIAGNOSTICS     LABS  Recent Labs     03/31/25  0614      K 3.3*   CO2 24   BUN 30.8*   CREATININE 0.87   GLUCOSE 118   CALCIUM 7.8*   ALKPHOS 81   AST 22   ALT 14   ALBUMIN 2.4*     Recent Labs     03/31/25  0614   WBC 13.89*   RBC 3.76*   HCT 29.7*    MCV 79.0*        MICROBIOLOGY  Microbiology Results (last 7 days)       Procedure Component Value Units Date/Time    Blood Culture [[id number]] Collected: 03/31/25 0614    Order Status: Resulted Specimen: Blood Updated: 03/31/25 0632    Blood Culture [[id number]] Collected: 03/31/25 0619    Order Status: Resulted Specimen: Blood Updated: 03/31/25 0632    Urine culture [[id number]] Collected: 03/30/25 2053    Order Status: Sent Specimen: Urine, Catheterized Updated: 03/30/25 2112          MEDICATIONS   cefTRIAXone (Rocephin) IV (PEDS and ADULTS)  1 g Intravenous Q24H    enoxparin  40 mg Subcutaneous Daily    famotidine  20 mg Oral BID    polyethylene glycol  17 g Oral Daily    potassium chloride  20 mEq Oral BID      INFUSIONS   0.9% NaCl  1,000 mL Intravenous Continuous 75 mL/hr at 03/31/25 0741 Rate Verify at 03/31/25 0741     DIAGNOSTIC TESTS  CT Abdomen Pelvis  Without Contrast   Final Result      Significantly limited study due the lack of contrast and motion.      Cholelithiasis without evidence of acute cholecystitis         Electronically signed by: Damien West MD   Date:    03/30/2025   Time:    18:59      X-Ray Chest AP Portable   Final Result      No acute abnormalities are seen         Electronically signed by: Damien West MD   Date:    03/30/2025   Time:    18:09         Patient information was obtained from patient, patient's family, past medical records and ER records.   All diagnosis and differential diagnosis have been reviewed; assessment and plan has been documented. I have personally reviewed the labs and test results that are presently available; I have reviewed the patients medication list. I have reviewed the consulting providers response and recommendations. I have reviewed or attempted to review medical records based upon their availability.  All of the patient's questions have been addressed and answered. Patient's is agreeable to the above stated plan. I will continue to  monitor closely and make adjustments to medical management as needed.  This note was created using [employer] voice recognition software that occasionally misinterpreted phrases or words.  Please contact me if any questions may rise regarding documentation to clarify verbiage.      TITI Vasquez   Internal Medicine  Department of Hospital Medicine [facility]       [1]   Social History  Socioeconomic History    Marital status: Single   Tobacco Use    Smoking status: Never    Smokeless tobacco: Never   Substance and Sexual Activity    Alcohol use: Never    Drug use: Never    Sexual activity: Not Currently

## 2025-03-31 NOTE — PLAN OF CARE
Problem: Physical Therapy  Goal: Physical Therapy Goal  Description: Goals to be met by: discharge     Patient will increase functional independence with mobility by performin. Bed to chair transfer with Stand-by Assistance using Rolling Walker.  2. Gait  x 150 feet with Stand-by Assistance using Rolling Walker.     Outcome: Progressing

## 2025-04-01 LAB
ANION GAP SERPL CALC-SCNC: 9 MEQ/L
BASOPHILS # BLD AUTO: 0.02 X10(3)/MCL
BASOPHILS NFR BLD AUTO: 0.2 %
BUN SERPL-MCNC: 20.1 MG/DL (ref 9.8–20.1)
CALCIUM SERPL-MCNC: 8 MG/DL (ref 8.4–10.2)
CHLORIDE SERPL-SCNC: 110 MMOL/L (ref 98–111)
CO2 SERPL-SCNC: 24 MMOL/L (ref 23–31)
CREAT SERPL-MCNC: 0.75 MG/DL (ref 0.55–1.02)
CREAT/UREA NIT SERPL: 27
EOSINOPHIL # BLD AUTO: 0.03 X10(3)/MCL (ref 0–0.9)
EOSINOPHIL NFR BLD AUTO: 0.2 %
ERYTHROCYTE [DISTWIDTH] IN BLOOD BY AUTOMATED COUNT: 17.1 % (ref 11.5–17)
GFR SERPLBLD CREATININE-BSD FMLA CKD-EPI: >60 ML/MIN/1.73/M2
GLUCOSE SERPL-MCNC: 113 MG/DL (ref 75–121)
HCT VFR BLD AUTO: 28.4 % (ref 37–47)
HGB BLD-MCNC: 9.1 G/DL (ref 12–16)
IMM GRANULOCYTES # BLD AUTO: 0.08 X10(3)/MCL (ref 0–0.04)
IMM GRANULOCYTES NFR BLD AUTO: 0.7 %
LYMPHOCYTES # BLD AUTO: 0.55 X10(3)/MCL (ref 0.6–4.6)
LYMPHOCYTES NFR BLD AUTO: 4.5 %
MAGNESIUM SERPL-MCNC: 2.4 MG/DL (ref 1.6–2.6)
MCH RBC QN AUTO: 25.6 PG (ref 27–31)
MCHC RBC AUTO-ENTMCNC: 32 G/DL (ref 33–36)
MCV RBC AUTO: 79.8 FL (ref 80–94)
MONOCYTES # BLD AUTO: 1.2 X10(3)/MCL (ref 0.1–1.3)
MONOCYTES NFR BLD AUTO: 9.8 %
NEUTROPHILS # BLD AUTO: 10.36 X10(3)/MCL (ref 2.1–9.2)
NEUTROPHILS NFR BLD AUTO: 84.6 %
PLATELET # BLD AUTO: 322 X10(3)/MCL (ref 130–400)
PMV BLD AUTO: 10.4 FL (ref 7.4–10.4)
POCT GLUCOSE: 105 MG/DL (ref 70–110)
POCT GLUCOSE: 146 MG/DL (ref 70–110)
POCT GLUCOSE: 152 MG/DL (ref 70–110)
POCT GLUCOSE: 173 MG/DL (ref 70–110)
POTASSIUM SERPL-SCNC: 3.8 MMOL/L (ref 3.5–5.1)
RBC # BLD AUTO: 3.56 X10(6)/MCL (ref 4.2–5.4)
SODIUM SERPL-SCNC: 143 MMOL/L (ref 136–145)
WBC # BLD AUTO: 12.24 X10(3)/MCL (ref 4.5–11.5)

## 2025-04-01 PROCEDURE — 83735 ASSAY OF MAGNESIUM: CPT | Performed by: PHYSICIAN ASSISTANT

## 2025-04-01 PROCEDURE — 94760 N-INVAS EAR/PLS OXIMETRY 1: CPT

## 2025-04-01 PROCEDURE — 36415 COLL VENOUS BLD VENIPUNCTURE: CPT | Performed by: PHYSICIAN ASSISTANT

## 2025-04-01 PROCEDURE — S4991 NICOTINE PATCH NONLEGEND: HCPCS | Performed by: PHYSICIAN ASSISTANT

## 2025-04-01 PROCEDURE — 25000003 PHARM REV CODE 250: Performed by: FAMILY MEDICINE

## 2025-04-01 PROCEDURE — 80048 BASIC METABOLIC PNL TOTAL CA: CPT | Performed by: PHYSICIAN ASSISTANT

## 2025-04-01 PROCEDURE — 94618 PULMONARY STRESS TESTING: CPT

## 2025-04-01 PROCEDURE — 25000003 PHARM REV CODE 250: Performed by: PHYSICIAN ASSISTANT

## 2025-04-01 PROCEDURE — 99900035 HC TECH TIME PER 15 MIN (STAT)

## 2025-04-01 PROCEDURE — 97116 GAIT TRAINING THERAPY: CPT

## 2025-04-01 PROCEDURE — 27000221 HC OXYGEN, UP TO 24 HOURS

## 2025-04-01 PROCEDURE — 85025 COMPLETE CBC W/AUTO DIFF WBC: CPT | Performed by: PHYSICIAN ASSISTANT

## 2025-04-01 PROCEDURE — 11000001 HC ACUTE MED/SURG PRIVATE ROOM

## 2025-04-01 PROCEDURE — 97110 THERAPEUTIC EXERCISES: CPT

## 2025-04-01 PROCEDURE — 63600175 PHARM REV CODE 636 W HCPCS: Performed by: PHYSICIAN ASSISTANT

## 2025-04-01 PROCEDURE — 63600175 PHARM REV CODE 636 W HCPCS: Performed by: SPECIALIST

## 2025-04-01 PROCEDURE — 25000003 PHARM REV CODE 250: Performed by: SPECIALIST

## 2025-04-01 RX ORDER — METFORMIN HYDROCHLORIDE 500 MG/1
500 TABLET ORAL 2 TIMES DAILY WITH MEALS
Status: DISCONTINUED | OUTPATIENT
Start: 2025-04-01 | End: 2025-04-02 | Stop reason: HOSPADM

## 2025-04-01 RX ADMIN — LOSARTAN POTASSIUM 100 MG: 50 TABLET, FILM COATED ORAL at 09:04

## 2025-04-01 RX ADMIN — ENOXAPARIN SODIUM 40 MG: 40 INJECTION SUBCUTANEOUS at 04:04

## 2025-04-01 RX ADMIN — FAMOTIDINE 20 MG: 20 TABLET, FILM COATED ORAL at 10:04

## 2025-04-01 RX ADMIN — CETIRIZINE HYDROCHLORIDE 10 MG: 10 TABLET, FILM COATED ORAL at 09:04

## 2025-04-01 RX ADMIN — Medication 9 MG: at 10:04

## 2025-04-01 RX ADMIN — NICOTINE 1 PATCH: 7 PATCH, EXTENDED RELEASE TRANSDERMAL at 09:04

## 2025-04-01 RX ADMIN — FAMOTIDINE 20 MG: 20 TABLET, FILM COATED ORAL at 09:04

## 2025-04-01 RX ADMIN — CEFTRIAXONE SODIUM 1 G: 1 INJECTION, POWDER, FOR SOLUTION INTRAMUSCULAR; INTRAVENOUS at 06:04

## 2025-04-01 RX ADMIN — PRAVASTATIN SODIUM 20 MG: 10 TABLET ORAL at 09:04

## 2025-04-01 RX ADMIN — ACETAMINOPHEN 650 MG: 325 TABLET ORAL at 04:04

## 2025-04-01 RX ADMIN — METFORMIN HYDROCHLORIDE 500 MG: 500 TABLET ORAL at 04:04

## 2025-04-01 RX ADMIN — HYDROCHLOROTHIAZIDE 25 MG: 12.5 TABLET ORAL at 09:04

## 2025-04-01 NOTE — CARE UPDATE
04/01/25 0956   Home Oxygen Qualification   $ Home O2 Qualification Pulmonary Stress Test/6 min walk   Room Air SpO2 At Rest 94 %   Room Air SpO2 During Ambulation 92 %   SpO2 During Ambulation on O2 94 %   Heart Rate on O2 74 bpm   Ambulation O2 LPM 1 LPM   Home O2 Eval Comments Pt to chair from bed on RA o2sats remain in low 90's.

## 2025-04-01 NOTE — PROGRESS NOTES
[facility]  HOSPITAL MEDICINE ~ PROGRESS NOTE    CHIEF COMPLAINT     Hospital follow up for uti     HOSPITAL COURSE     89-year-old female with a past medical history of HTN, DM2, chronic tobacco use, and unknown any other past medical history who has a somewhat poor historian and presented to the ER for progressive weakness.  Patient denies any complaints upon exam.  Patient was noted to be hypotensive upon arrival and ended up developing a fever. Labs significant for leukocytosis, anemia, hypokalemia, JAVIER, hypomagnesemia, and UTI .  Chest x-ray without acute abnormalities.  CTA abdomen and pelvis limited due to lack of contrast and motion cholelithiasis without evidence of acute cholecystitis.  Patient was admitted to Hospital Medicine Service and started on Rocephin.  Leukocytosis improved today as well as magnesium and renal function.  Urine culture pending, blood cultures pending.  Patient reports she lives at home alone and does not require any assistive device.     Today:  Culture growing Gram-negative rods.  Awaiting final culture results.  Likely discharge home tomorrow with rolling walker and home health services.    OBJECTIVE/PHYSICAL EXAM     VITAL SIGNS (MOST RECENT):  Temp: 99 °F (37.2 °C) (04/01/25 1108)  Pulse: 84 (04/01/25 1108)  Resp: 18 (04/01/25 0745)  BP: (!) 146/72 (04/01/25 1108)  SpO2: (!) 94 % (04/01/25 1108) VITAL SIGNS (24 HOUR RANGE):  Temp:  [98.1 °F (36.7 °C)-99.4 °F (37.4 °C)] 99 °F (37.2 °C)  Pulse:  [63-84] 84  Resp:  [17-18] 18  SpO2:  [92 %-98 %] 94 %  BP: ()/(47-78) 146/72     General:  In no acute distress, resting comfortably  Head and neck:  Atraumatic, normocephalic, moist mucous membranes, supple neck  Chest:  Clear to auscultation bilaterally  Heart:  S1, S2, no appreciable murmur  Abdomen:  Soft, nontender, BS +  MSK:  Warm, no lower extremity edema, no clubbing or cyanosis  Neuro:  Alert and oriented, moving all extremities with good  strength  Integumentary:  No obvious skin rash  Psychiatry:  Appropriate mood and affect    ASSESSMENT/PLAN     Severe sepsis 2/2 UTI POA  Lactic acidosis resolved   Continue empiric Rocephin  Urine culture showing Gram-negative rods  Blood cultures with no growth     Hypotension with hx of HTN  Home hydrochlorothiazide and losartan resumed     Hypokalemia  Hypomagnesemia   Replete as condition requires      Anemia   H&H slightly below baseline possibly 2/2 hemodilution - monitor      JAVIER - resolved     Weakness  PT/OT   Home health and rolling walker upon discharge     DM2   SSI   Resume metformin     Chronic tobacco use   Smokes 4 cigarettes a day   Nicotine patch      DVT prophylaxis:  Lovenox    Anticipated discharge and disposition: Probable discharge tomorrow with home health   __________________________________________________________________________    LABS/MICRO/MEDS/DIAGNOSTICS     LABS  Recent Labs     03/31/25 0614 04/01/25  0441    143   K 3.3* 3.8   CO2 24 24   BUN 30.8* 20.1   CREATININE 0.87 0.75   GLUCOSE 118 113   CALCIUM 7.8* 8.0*   ALKPHOS 81  --    AST 22  --    ALT 14  --    ALBUMIN 2.4*  --      Recent Labs     04/01/25  0441   WBC 12.24*   RBC 3.56*   HCT 28.4*   MCV 79.8*        MICROBIOLOGY  Microbiology Results (last 7 days)       Procedure Component Value Units Date/Time    Blood Culture [[id number]]  (Normal) Collected: 03/31/25 0614    Order Status: Completed Specimen: Blood Updated: 04/01/25 1201     Blood Culture No Growth At 24 Hours    Blood Culture [[id number]]  (Normal) Collected: 03/31/25 0619    Order Status: Completed Specimen: Blood Updated: 04/01/25 1201     Blood Culture No Growth At 24 Hours    Urine culture [[id number]]  (Abnormal) Collected: 03/30/25 2053    Order Status: Completed Specimen: Urine, Catheterized Updated: 04/01/25 0646     Urine Culture >/= 100,000 colonies/ml Gram-negative Rods             MEDICATIONS   cefTRIAXone (Rocephin) IV (PEDS and  "ADULTS)  1 g Intravenous Q24H    cetirizine  10 mg Oral Daily    diclofenac sodium  2 g Topical (Top) BID    enoxparin  40 mg Subcutaneous Daily    famotidine  20 mg Oral BID    hydroCHLOROthiazide  25 mg Oral Daily    losartan  100 mg Oral Daily    metFORMIN  500 mg Oral BID WM    nicotine  1 patch Transdermal Daily    polyethylene glycol  17 g Oral Daily    pravastatin  20 mg Oral Daily         INFUSIONS       DIAGNOSTIC TESTS  CT Abdomen Pelvis  Without Contrast   Final Result      Significantly limited study due the lack of contrast and motion.      Cholelithiasis without evidence of acute cholecystitis         Electronically signed by: Damien West MD   Date:    03/30/2025   Time:    18:59      X-Ray Chest AP Portable   Final Result      No acute abnormalities are seen         Electronically signed by: Damien West MD   Date:    03/30/2025   Time:    18:09         No results found for: "EF"     NUTRITION STATUS  Patient meets ASPEN criteria for   malnutrition of   per RD assessment as evidenced by:                       A minimum of two characteristics is recommended for diagnosis of either severe or non-severe malnutrition.     Case related differential diagnoses have been reviewed; assessment and plan has been documented. I have personally reviewed the labs and test results that are currently available; I have reviewed the patients medication list. I have reviewed the consulting providers recommendations. I have reviewed or attempted to review medical records based upon their availability.  All of the patient's and/or family's questions have been addressed and answered to the best of my ability.  I will continue to monitor closely and make adjustments to medical management as needed.  This document was created using M*Modal Fluency Direct.  Transcription errors may have been made.  Please contact me if any questions may rise regarding documentation to clarify transcription.      TITI Vasquez "   Internal Medicine  Department of Hospital Medicine [facility]

## 2025-04-01 NOTE — PROGRESS NOTES
Name: Yifan FONSECA Kaushal    : 3/9/1933 (89 y.o.)  MRN: [id number]           Patient Unavailable      Patient unable to be seen at this time secondary to: pt refused PM session due to being cold and not wanting to get out of bed at this time. Plan to DC home tomorrow.

## 2025-04-01 NOTE — PLAN OF CARE
Problem: Adult Inpatient Plan of Care  Goal: Plan of Care Review  Outcome: Progressing  Flowsheets (Taken 4/1/2025 0800)  Plan of Care Reviewed With:   patient   child  Goal: Patient-Specific Goal (Individualized)  Outcome: Progressing  Flowsheets (Taken 4/1/2025 0800)  Individualized Care Needs:   Safety with mobility to prevent injury   Monitor for s/s of infection worsening   IV hydration   Accurate I&O   Pain management.  Anxieties, Fears or Concerns: Ready to go home  Patient/Family-Specific Goals (Include Timeframe): Improved condition by WENDY for return home with self/family care  Goal: Absence of Hospital-Acquired Illness or Injury  Outcome: Progressing  Goal: Optimal Comfort and Wellbeing  Outcome: Progressing  Goal: Readiness for Transition of Care  Outcome: Progressing     Problem: Fall Injury Risk  Goal: Absence of Fall and Fall-Related Injury  Outcome: Progressing     Problem: UTI (Urinary Tract Infection)  Goal: Improved Infection Symptoms  Outcome: Progressing

## 2025-04-01 NOTE — PT/OT/SLP PROGRESS
Physical Therapy Treatment Note           Name: Yifan Easley    : 3/9/1933 (89 y.o.)  MRN: [id number]           TREATMENT SUMMARY AND RECOMMENDATIONS:    PT Received On: 25  PT Start Time: 905     PT Stop Time: 930  PT Total Time (min): 25 min     Subjective Assessment:  x No complaints  Lethargic    Awake, alert, cooperative  Uncooperative    Agitated  c/o pain    Appropriate  c/o fatigue    Confused  Treated at bedside     Emotionally labile  Treated in gym/dept.    Impulsive x Other: numbness in feet    Flat affect       Therapy Precautions:    Cognitive deficits  Spinal precautions    Collar - hard  Sternal precautions    Collar - soft   TLSO   x Fall risk  LSO    Hip precautions - posterior  Knee immobilizer    Hip precautions - anterior  WBAT    Impaired communication  Partial weightbearing    Oxygen  TTWB    PEG tube  NWB    Visual deficits  Other:    Hearing deficits          Treatment Objectives:     Mobility Training:   Assist level Comments    Bed mobility SPV Supine>sit   Transfer SBA/SPV Stand pivot with RW   Gait Min A    SBA/SPV 70 ft level surfaces with no AD - general instability, decreased ZULEYMA, decreased step length  90 ft level surfaces with RW - improved quality    Sit to stand transitions SBA/SPV Sit<>stand with BUE use   Sitting balance     Standing balance      Wheelchair mobility     Car transfer     Other:          Therapeutic Exercise:   Exercise Sets Reps Comments   Hip flex, hip abd<>add, LAQ, ankle PF<>DF 1 30 Performed short sitting  2# ankle weights                         Additional Comments:  C/o numbness in B feet - chronic issue.    Assessment: Patient tolerated session well. PT recommending use of rolling walker at discharge due to safety concerns and to increase independence. Patient agreeable and voiced understanding.     PT Plan: Cont PT POC.  Revisions made to plan of care: No    GOALS:   Multidisciplinary Problems       Physical Therapy Goals          Problem:  Physical Therapy    Goal Priority Disciplines Outcome Interventions   Physical Therapy Goal     PT, PT/OT Progressing    Description: Goals to be met by: discharge     Patient will increase functional independence with mobility by performin. Bed to chair transfer with Stand-by Assistance using Rolling Walker.  2. Gait  x 150 feet with Stand-by Assistance using Rolling Walker.                          Skilled PT Minutes Provided: 25   Communication with Treatment Team:     Equipment recommendations:       At end of treatment, patient remained:  x Up in chair     Up in wheelchair in room    In bed   x With alarm activated    Bed rails up   x Call bell in reach     Family/friends present    Restraints secured properly    In bathroom with CNA/RN notified    Nurse aware    In gym with therapist/tech    Other:

## 2025-04-02 VITALS
BODY MASS INDEX: 17.13 KG/M2 | HEART RATE: 81 BPM | WEIGHT: 100.31 LBS | OXYGEN SATURATION: 92 % | HEIGHT: 64 IN | RESPIRATION RATE: 17 BRPM | TEMPERATURE: 99 F | DIASTOLIC BLOOD PRESSURE: 76 MMHG | SYSTOLIC BLOOD PRESSURE: 136 MMHG

## 2025-04-02 LAB
BACTERIA UR CULT: ABNORMAL
POCT GLUCOSE: 130 MG/DL (ref 70–110)
POCT GLUCOSE: 211 MG/DL (ref 70–110)

## 2025-04-02 PROCEDURE — S4991 NICOTINE PATCH NONLEGEND: HCPCS | Performed by: PHYSICIAN ASSISTANT

## 2025-04-02 PROCEDURE — 93005 ELECTROCARDIOGRAM TRACING: CPT

## 2025-04-02 PROCEDURE — 25000003 PHARM REV CODE 250: Performed by: SPECIALIST

## 2025-04-02 PROCEDURE — 99900031 HC PATIENT EDUCATION (STAT)

## 2025-04-02 PROCEDURE — 93010 ELECTROCARDIOGRAM REPORT: CPT | Mod: ,,, | Performed by: INTERNAL MEDICINE

## 2025-04-02 PROCEDURE — 25000003 PHARM REV CODE 250: Performed by: PHYSICIAN ASSISTANT

## 2025-04-02 PROCEDURE — 25000003 PHARM REV CODE 250: Performed by: FAMILY MEDICINE

## 2025-04-02 RX ORDER — NICOTINE 7MG/24HR
1 PATCH, TRANSDERMAL 24 HOURS TRANSDERMAL DAILY
Qty: 30 PATCH | Refills: 0 | Status: SHIPPED | OUTPATIENT
Start: 2025-04-02

## 2025-04-02 RX ORDER — CIPROFLOXACIN 500 MG/1
500 TABLET ORAL EVERY 12 HOURS
Qty: 10 TABLET | Refills: 0 | Status: SHIPPED | OUTPATIENT
Start: 2025-04-02 | End: 2025-04-07

## 2025-04-02 RX ADMIN — HYDROCHLOROTHIAZIDE 25 MG: 12.5 TABLET ORAL at 09:04

## 2025-04-02 RX ADMIN — CETIRIZINE HYDROCHLORIDE 10 MG: 10 TABLET, FILM COATED ORAL at 09:04

## 2025-04-02 RX ADMIN — FAMOTIDINE 20 MG: 20 TABLET, FILM COATED ORAL at 09:04

## 2025-04-02 RX ADMIN — LOSARTAN POTASSIUM 100 MG: 50 TABLET, FILM COATED ORAL at 09:04

## 2025-04-02 RX ADMIN — PRAVASTATIN SODIUM 20 MG: 10 TABLET ORAL at 09:04

## 2025-04-02 RX ADMIN — NICOTINE 1 PATCH: 7 PATCH, EXTENDED RELEASE TRANSDERMAL at 09:04

## 2025-04-02 RX ADMIN — METFORMIN HYDROCHLORIDE 500 MG: 500 TABLET ORAL at 09:04

## 2025-04-02 NOTE — DISCHARGE INSTRUCTIONS
Please follow all discharge instructions as given. KEEP ALL FOLLOW UP APPOINTMENTS!    If you experience any worsening or NEW signs or symptoms please call your primary care provider or head to your nearest emergency department.     THANK YOU FOR CHOOSING [facility].  If you have any questions please call [phone].

## 2025-04-02 NOTE — PLAN OF CARE
Problem: Adult Inpatient Plan of Care  Goal: Plan of Care Review  Outcome: Progressing  Flowsheets (Taken 4/2/2025 0800)  Plan of Care Reviewed With: patient  Goal: Patient-Specific Goal (Individualized)  Outcome: Progressing  Flowsheets (Taken 4/2/2025 0800)  Individualized Care Needs: Safety with ambulation, IV Abx, reorientation  Anxieties, Fears or Concerns: anticipating discharge  Patient/Family-Specific Goals (Include Timeframe): Improvement in condition by d/c  Goal: Absence of Hospital-Acquired Illness or Injury  Outcome: Progressing  Goal: Optimal Comfort and Wellbeing  Outcome: Progressing  Goal: Readiness for Transition of Care  Outcome: Progressing

## 2025-04-02 NOTE — PLAN OF CARE
Problem: Occupational Therapy  Goal: Occupational Therapy Goal  Description: Goals to be met by: 4/14/25     Patient will increase functional independence with ADLs by performing:    LE Dressing with Supervision.  Toileting from toilet with Supervision for hygiene and clothing management.   Toilet transfer to toilet with Supervision.    Outcome: Unable to Meet

## 2025-04-02 NOTE — DISCHARGE SUMMARY
[facility]  HOSPITAL MEDICINE - DISCHARGE SUMMARY    Patient Name: Yifan Ealsey  MRN: [id number]  Admission Date: 3/30/2025  Discharge Date: 04/02/2025  Hospital Length of Stay: 2 days  Discharge Provider: TITI Vasquez  Primary Care Provider: Malorie, Primary Doctor    HOSPITAL COURSE     89-year-old female with a past medical history of HTN, DM2, chronic tobacco use, and unknown any other past medical history who has a somewhat poor historian and presented to the ER for progressive weakness.  Patient denies any complaints upon exam.  Patient was noted to be hypotensive upon arrival and ended up developing a fever. Labs significant for leukocytosis, anemia, hypokalemia, JAVIER, hypomagnesemia, and UTI .  Chest x-ray without acute abnormalities.  CTA abdomen and pelvis limited due to lack of contrast and motion cholelithiasis without evidence of acute cholecystitis.  Patient was admitted to Hospital Medicine Service and started on Rocephin.  Leukocytosis improved today as well as magnesium and renal function.  Urine culture pending, blood cultures pending.  Patient reports she lives at home alone and does not require any assistive device.      Urine culture grew E coli sensitive to Cipro.  Patient will be discharged home on Cipro 500 mg b.i.d. for 5 days to complete appropriate antibiotic course.  She will be discharged with home health services.    PHYSICAL EXAM     Most Recent Vital Signs:  Temp: 98.4 °F (36.9 °C) (04/02/25 0729)  Pulse: 63 (04/02/25 0729)  Resp: 17 (04/02/25 0314)  BP: (!) 147/67 (04/02/25 0915)  SpO2: (!) 92 % (04/02/25 0729)     General:  In no acute distress, resting comfortably  Head and neck:  Atraumatic, normocephalic, moist mucous membranes, supple neck  Chest:  Clear to auscultation bilaterally  Heart:  S1, S2, no appreciable murmur  Abdomen:  Soft, nontender, BS +  MSK:  Warm, no lower extremity edema, no clubbing or cyanosis  Neuro:  Alert and oriented,  moving all extremities with good strength  Integumentary:  No obvious skin rash  Psychiatry:  Appropriate mood and affect    DISCHARGE DIAGNOSIS     Severe sepsis 2/2 E coli UTI POA  Lactic acidosis resolved   Transitioned to Cipro 500 mg b.i.d. p.o. x5 days upon discharge  Blood cultures with no growth     Hypotension with hx of HTN  Home hydrochlorothiazide and losartan resumed     Hypokalemia  Hypomagnesemia   Replete as condition requires      Anemia   H&H slightly below baseline possibly 2/2 hemodilution - monitor      JAVIER - resolved     Weakness  PT/OT   Home health and rolling walker upon discharge     DM2   Metformin     Chronic tobacco use   Smokes 4 cigarettes a day   Nicotine patch     Patient's screen for food insecurity, housing instability, transportation needs, utility difficulties, and interpersonal safety. Social work consulted for  home health   _____________________________________________________________________________    DISCHARGE MED REC     Current Discharge Medication List        START taking these medications    Details   ciprofloxacin HCl (CIPRO) 500 MG tablet Take 1 tablet (500 mg total) by mouth every 12 (twelve) hours. for 5 days  Qty: 10 tablet, Refills: 0      nicotine (NICODERM CQ) 7 mg/24 hr Place 1 patch onto the skin once daily.  Qty: 30 patch, Refills: 0           CONTINUE these medications which have NOT CHANGED    Details   alendronate (FOSAMAX) 70 MG tablet Take 70 mg by mouth every 7 days.      cetirizine (ZYRTEC) 10 MG tablet Take 10 mg by mouth once daily.      ergocalciferol (ERGOCALCIFEROL) 50,000 unit Cap Take 50,000 Units by mouth every 7 days.      hydroCHLOROthiazide (HYDRODIURIL) 25 MG tablet Take 25 mg by mouth once daily.      losartan (COZAAR) 100 MG tablet Take 100 mg by mouth once daily.      metFORMIN (FORTAMET) 500 mg 24hr tablet Take 500 mg by mouth daily with breakfast.      pravastatin (PRAVACHOL) 20 MG tablet Take 20 mg by mouth once daily.      diclofenac  sodium (VOLTAREN) 1 % Gel Apply 2 g topically once daily.  Qty: 100 g, Refills: 0            CONSULTS     FOLLOW UP      Follow-up Information       Salas Hong MD. Go on 4/15/2025.    Specialty: Family Medicine  Why: Follow up appointment with Salas Hong MD on Tuesday, April 15, 2025 @ 9:00 am.  Spoke to Sandra.  Contact information:  [address] [place] [address] [phone]               [facility]. Call.    Specialties: Home Health Services, Home Therapy Services, Home Living Aide Services  Contact information:  [address] [place] [id number] [phone]             [facility] Follow up.    Contact information:  [address] [facility] [address] [phone]                         DISCHARGE INSTRUCTIONS     Explained in detail to the patient about the discharge plan, medications, and follow-up visits. Pt understands and agrees with the treatment plan.  Discharged Condition: stable  Diet as tolerated  Activities as tolerated  Discharge to: Home-Health Care [facility]    TIME SPENT ON DISCHARGE     35 minutes    ITTI Vasquez  Internal Medicine  Department of Hospital Medicine [facility]    This document was created using electronic dictation services.  Please excuse any errors that may have been made.  Contact me if any questions regarding documentation to clarify verbiage.

## 2025-04-02 NOTE — PT/OT/SLP DISCHARGE
Occupational Therapy Discharge Summary    Yifan Easley  MRN: [id number]   Principal Problem: Acute UTI      Patient Discharged from acute Occupational Therapy on 4/2/25.    Assessment:      Patient appropriate for care in another setting.    Objective:     GOALS:   Multidisciplinary Problems       Occupational Therapy Goals          Problem: Occupational Therapy    Goal Priority Disciplines Outcome Interventions   Occupational Therapy Goal     OT, PT/OT Unable to Meet    Description: Goals to be met by: 4/14/25     Patient will increase functional independence with ADLs by performing:    LE Dressing with Supervision.  Toileting from toilet with Supervision for hygiene and clothing management.   Toilet transfer to toilet with Supervision.                         Reasons for Discontinuation of Therapy Services  Transfer to alternate level of care.      Plan:     Patient Discharged to: Home with Home Health Service    4/2/2025

## 2025-04-02 NOTE — PLAN OF CARE
Problem: Adult Inpatient Plan of Care  Goal: Plan of Care Review  Outcome: Progressing  Flowsheets (Taken 4/2/2025 0653)  Plan of Care Reviewed With: patient  Goal: Patient-Specific Goal (Individualized)  Outcome: Progressing  Flowsheets (Taken 4/2/2025 0653)  Individualized Care Needs: safety, iv abx, reorientation  Anxieties, Fears or Concerns: anticipating discharge  Patient/Family-Specific Goals (Include Timeframe): improvement in condition by D/C  Goal: Optimal Comfort and Wellbeing  Outcome: Progressing  Intervention: Monitor Pain and Promote Comfort  Flowsheets (Taken 4/2/2025 0653)  Pain Management Interventions:   care clustered   pillow support provided   position adjusted   quiet environment facilitated  Intervention: Provide Person-Centered Care  Flowsheets (Taken 4/2/2025 0653)  Trust Relationship/Rapport:   care explained   choices provided   emotional support provided   empathic listening provided   questions answered   thoughts/feelings acknowledged   reassurance provided   questions encouraged     Problem: Fall Injury Risk  Goal: Absence of Fall and Fall-Related Injury  Outcome: Progressing  Intervention: Identify and Manage Contributors  Flowsheets (Taken 4/2/2025 0653)  Self-Care Promotion:   independence encouraged   BADL personal objects within reach   BADL personal routines maintained  Medication Review/Management: medications reviewed     Problem: UTI (Urinary Tract Infection)  Goal: Improved Infection Symptoms  Outcome: Progressing  Intervention: Prevent Infection Progression  Flowsheets (Taken 4/2/2025 0653)  Fever Reduction/Comfort Measures:   lightweight clothing   lightweight bedding  Infection Management: aseptic technique maintained  Intervention: Facilitate Optimal Urinary Elimination  Flowsheets (Taken 4/2/2025 0653)  Urinary Elimination Promotion: (purewick)   absorbent pad/diaper use encouraged   other (see comments)

## 2025-04-03 ENCOUNTER — PATIENT OUTREACH (OUTPATIENT)
Dept: ADMINISTRATIVE | Facility: CLINIC | Age: OVER 89
End: 2025-04-03
Payer: MEDICARE

## 2025-04-03 LAB
OHS QRS DURATION: 80 MS
OHS QTC CALCULATION: 431 MS

## 2025-04-03 NOTE — PROGRESS NOTES
C3 nurse attempted to contact Yifan Easley for a TCC post hospital discharge follow-up call. The patient declined call at this time, as she had a hard time understanding C3 nurse, and no needs at this time.

## 2025-04-03 NOTE — TELEPHONE ENCOUNTER
The patient was not able to understand C3 nurse fully, but did note that she received her antibiotics and is unsure about nicotine patches. She notes she only had 1 cigarette since discharge. She received her Walker and has PCP [facility] on 4/15/25. She declines any concerns or needs at this time.

## 2025-04-05 LAB
BACTERIA BLD CULT: NORMAL
BACTERIA BLD CULT: NORMAL

## 2025-04-07 NOTE — PT/OT/SLP DISCHARGE
Physical Therapy Discharge Summary    Name: Yifan Easley  MRN: [id number]   Principal Problem: Acute UTI     Patient Discharged from acute Physical Therapy on 25.       Assessment:     Patient appropriate for care in another setting.    Objective:     GOALS:   Multidisciplinary Problems       Physical Therapy Goals          Problem: Physical Therapy    Goal Priority Disciplines Outcome Interventions   Physical Therapy Goal     PT, PT/OT Adequate for Care Transition    Description: Goals to be met by: discharge     Patient will increase functional independence with mobility by performin. Bed to chair transfer with Stand-by Assistance using Rolling Walker.  2. Gait  x 150 feet with Stand-by Assistance using Rolling Walker.                          Reasons for Discontinuation of Therapy Services  Transfer to alternate level of care.      Plan:     Patient Discharged to: Home with Home Health Service.      2025

## 2025-04-07 NOTE — PLAN OF CARE
Problem: Physical Therapy  Goal: Physical Therapy Goal  Description: Goals to be met by: discharge     Patient will increase functional independence with mobility by performin. Bed to chair transfer with Stand-by Assistance using Rolling Walker.  2. Gait  x 150 feet with Stand-by Assistance using Rolling Walker.     Outcome: Adequate for Care Transition